# Patient Record
Sex: FEMALE | Race: WHITE | NOT HISPANIC OR LATINO | Employment: FULL TIME | URBAN - METROPOLITAN AREA
[De-identification: names, ages, dates, MRNs, and addresses within clinical notes are randomized per-mention and may not be internally consistent; named-entity substitution may affect disease eponyms.]

---

## 2019-05-10 ENCOUNTER — OFFICE VISIT (OUTPATIENT)
Dept: URGENT CARE | Age: 24
End: 2019-05-10
Payer: COMMERCIAL

## 2019-05-10 VITALS
RESPIRATION RATE: 18 BRPM | DIASTOLIC BLOOD PRESSURE: 59 MMHG | HEART RATE: 94 BPM | SYSTOLIC BLOOD PRESSURE: 130 MMHG | TEMPERATURE: 98.5 F | WEIGHT: 215 LBS | HEIGHT: 64 IN | OXYGEN SATURATION: 97 % | BODY MASS INDEX: 36.7 KG/M2

## 2019-05-10 DIAGNOSIS — J02.9 SORE THROAT: ICD-10-CM

## 2019-05-10 DIAGNOSIS — J03.90 ACUTE TONSILLITIS, UNSPECIFIED ETIOLOGY: Primary | ICD-10-CM

## 2019-05-10 LAB — S PYO AG THROAT QL: NEGATIVE

## 2019-05-10 PROCEDURE — 87430 STREP A AG IA: CPT | Performed by: FAMILY MEDICINE

## 2019-05-10 PROCEDURE — G0382 LEV 3 HOSP TYPE B ED VISIT: HCPCS | Performed by: FAMILY MEDICINE

## 2019-05-10 PROCEDURE — 87070 CULTURE OTHR SPECIMN AEROBIC: CPT | Performed by: FAMILY MEDICINE

## 2019-05-10 RX ORDER — AMOXICILLIN 500 MG/1
500 CAPSULE ORAL EVERY 8 HOURS SCHEDULED
Qty: 30 CAPSULE | Refills: 0 | Status: SHIPPED | OUTPATIENT
Start: 2019-05-10 | End: 2019-05-20

## 2019-05-12 LAB — BACTERIA THROAT CULT: NORMAL

## 2019-07-11 ENCOUNTER — OFFICE VISIT (OUTPATIENT)
Dept: OBGYN CLINIC | Facility: CLINIC | Age: 24
End: 2019-07-11
Payer: COMMERCIAL

## 2019-07-11 VITALS
HEIGHT: 65 IN | DIASTOLIC BLOOD PRESSURE: 74 MMHG | BODY MASS INDEX: 35.39 KG/M2 | WEIGHT: 212.4 LBS | HEART RATE: 74 BPM | OXYGEN SATURATION: 100 % | SYSTOLIC BLOOD PRESSURE: 114 MMHG

## 2019-07-11 DIAGNOSIS — Z11.3 SCREENING EXAMINATION FOR STD (SEXUALLY TRANSMITTED DISEASE): ICD-10-CM

## 2019-07-11 DIAGNOSIS — Z12.4 ENCOUNTER FOR PAPANICOLAOU SMEAR FOR CERVICAL CANCER SCREENING: ICD-10-CM

## 2019-07-11 DIAGNOSIS — Z30.016 ENCOUNTER FOR INITIAL PRESCRIPTION OF TRANSDERMAL PATCH HORMONAL CONTRACEPTIVE DEVICE: ICD-10-CM

## 2019-07-11 DIAGNOSIS — Z01.419 ENCOUNTER FOR GYNECOLOGICAL EXAMINATION WITHOUT ABNORMAL FINDING: Primary | ICD-10-CM

## 2019-07-11 DIAGNOSIS — N92.6 MISSED MENSES: ICD-10-CM

## 2019-07-11 LAB — SL AMB POCT URINE HCG: NEGATIVE

## 2019-07-11 PROCEDURE — 87660 TRICHOMONAS VAGIN DIR PROBE: CPT | Performed by: NURSE PRACTITIONER

## 2019-07-11 PROCEDURE — 81025 URINE PREGNANCY TEST: CPT | Performed by: NURSE PRACTITIONER

## 2019-07-11 PROCEDURE — 87591 N.GONORRHOEAE DNA AMP PROB: CPT | Performed by: NURSE PRACTITIONER

## 2019-07-11 PROCEDURE — 87480 CANDIDA DNA DIR PROBE: CPT | Performed by: NURSE PRACTITIONER

## 2019-07-11 PROCEDURE — 87510 GARDNER VAG DNA DIR PROBE: CPT | Performed by: NURSE PRACTITIONER

## 2019-07-11 PROCEDURE — 99395 PREV VISIT EST AGE 18-39: CPT | Performed by: NURSE PRACTITIONER

## 2019-07-11 PROCEDURE — 87491 CHLMYD TRACH DNA AMP PROBE: CPT | Performed by: NURSE PRACTITIONER

## 2019-07-11 PROCEDURE — G0145 SCR C/V CYTO,THINLAYER,RESCR: HCPCS | Performed by: NURSE PRACTITIONER

## 2019-07-11 NOTE — PROGRESS NOTES
Assessment / Plan    1  Encounter for gynecological examination without abnormal finding  Normal well woman exam  Pap smear obtained    2  Encounter for Papanicolaou smear for cervical cancer screening      3  Screening examination for STD (sexually transmitted disease)  Agrees to STI screening  Strongly counseled about risk for STDs and safe sex practices  4  Encounter for initial prescription of transdermal patch hormonal contraceptive device  Reviewed all birth control method options  Would like to try the patch to start  Reviewed method for starting and maintaining  Rx sent to her pharmacy  - norelgestromin-ethinyl estradiol (ORTHO EVRA) 150-35 MCG/24HR; Place 1 patch on the skin once a week  Dispense: 9 patch; Refill: 4    5  Missed menses  upt negative    - POCT urine HCG      Umair Still is a 26 y o  female who presents for her annual gynecologic exam   NEW PATIENT    25 yo g o presents for yearly and to initiate birth control  Interested in [place] IUD  Has taken OCP in the past   Did okay on it but didn't always remember to take it  [place] age 17; 11 lifetime partners; 3 partners in the last year  Not using condoms consistently  Periods are regular every 28-30 days  Current contraception: none  History of abnormal Pap smear: n/a  Family history of breast,uterine, ovarian or colon cancer: no    Menstrual History:  OB History        0    Para   0    Term   0       0    AB   0    Living   0       SAB   0    TAB   0    Ectopic   0    Multiple   0    Live Births   0                Menarche age: 17  Patient's last menstrual period was 06/10/2019    Period Cycle (Days): 28  Period Duration (Days): 3  Period Pattern: Regular  Menstrual Flow: Moderate  Dysmenorrhea: (!) Mild    The following portions of the patient's history were reviewed and updated as appropriate: allergies, current medications, past family history, past medical history, past social history, past surgical history and problem list     Review of Systems      Review of Systems   Constitutional: Negative for chills and fever  Gastrointestinal: Negative for abdominal distention, abdominal pain, blood in stool, constipation, diarrhea, nausea and vomiting  Genitourinary: Negative for difficulty urinating, dysuria, frequency, genital sores, hematuria, menstrual problem, pelvic pain, urgency, vaginal bleeding and vaginal discharge  Breasts:  Negative for skin changes, dimpling, asymmetry, nipple discharge, redness, tenderness or palpable masses    Objective      /74 (BP Location: Left arm, Patient Position: Sitting, Cuff Size: Adult)   Pulse 74   Ht 5' 5" (1 651 m)   Wt 96 3 kg (212 lb 6 4 oz)   LMP 06/10/2019   SpO2 100%   BMI 35 35 kg/m²      Physical Exam   Constitutional: She is oriented to person, place, and time  She appears well-developed and well-nourished  No distress  HENT:   Head: Normocephalic and atraumatic  Eyes: Pupils are equal, round, and reactive to light  Neck: Neck supple  No thyromegaly present  Pulmonary/Chest: Effort normal  Right breast exhibits no inverted nipple, no mass, no nipple discharge, no skin change and no tenderness  Left breast exhibits no inverted nipple, no mass, no nipple discharge, no skin change and no tenderness  Breasts are symmetrical    Abdominal: Soft  Normal appearance  She exhibits no mass  There is no tenderness  There is no CVA tenderness  Genitourinary: Pelvic exam was performed with patient supine  No labial fusion  There is no rash, tenderness, lesion or injury on the right labia  There is no rash, tenderness, lesion or injury on the left labia  Uterus is not enlarged and not tender  Cervix exhibits no motion tenderness, no discharge and no friability  Right adnexum displays no mass and no tenderness  Left adnexum displays no mass and no tenderness  No erythema, tenderness or bleeding in the vagina  No foreign body in the vagina  No signs of injury around the vagina  No vaginal discharge found  Lymphadenopathy:     She has no cervical adenopathy  She has no axillary adenopathy  Right: No inguinal and no supraclavicular adenopathy present  Left: No inguinal and no supraclavicular adenopathy present  Neurological: She is alert and oriented to person, place, and time  Skin: Skin is warm, dry and intact  Psychiatric: She has a normal mood and affect   Her behavior is normal  Thought content normal

## 2019-07-11 NOTE — PATIENT INSTRUCTIONS
Apply the first patch on the [facility] after your next menstrual period  Keep 1st patch on for one week  On the 2nd Sunday, remove the old patch and apply a new patch  On the 3rd Sunday, remove old patch and apply a new patch  On week 4 remove the patch and do not apply a new one  This is the week you will have menstrual bleeding  Repeat above on the following Sunday  Rotate patch sites to avoid irritation in the same spot

## 2019-07-12 LAB
C TRACH DNA SPEC QL NAA+PROBE: NEGATIVE
N GONORRHOEA DNA SPEC QL NAA+PROBE: NEGATIVE

## 2019-07-13 LAB
CANDIDA RRNA VAG QL PROBE: NEGATIVE
G VAGINALIS RRNA GENITAL QL PROBE: POSITIVE
T VAGINALIS RRNA GENITAL QL PROBE: NEGATIVE

## 2019-07-15 ENCOUNTER — TELEPHONE (OUTPATIENT)
Dept: OBGYN CLINIC | Facility: CLINIC | Age: 24
End: 2019-07-15

## 2019-07-15 DIAGNOSIS — N76.0 BACTERIAL VAGINOSIS: Primary | ICD-10-CM

## 2019-07-15 DIAGNOSIS — B96.89 BACTERIAL VAGINOSIS: Primary | ICD-10-CM

## 2019-07-15 RX ORDER — METRONIDAZOLE 500 MG/1
500 TABLET ORAL EVERY 12 HOURS SCHEDULED
Qty: 14 TABLET | Refills: 0 | Status: SHIPPED | OUTPATIENT
Start: 2019-07-15 | End: 2019-07-22

## 2019-07-15 NOTE — TELEPHONE ENCOUNTER
Left message for patient making her aware that prescription is at her pharmacy and all other cultures were negative

## 2019-07-15 NOTE — TELEPHONE ENCOUNTER
----- Message from Andi Duckworth 35 sent at 7/15/2019  8:24 AM EDT -----  Please inform patient that her vaginal culture showed overgrowth of bacteria  I will send a prescription to her pharmacy for oral metronidazole  Also inform her that her chlamydia and gonorrhea screening was negative  Thank you

## 2019-07-18 LAB
LAB AP GYN PRIMARY INTERPRETATION: NORMAL
Lab: NORMAL
PATH INTERP SPEC-IMP: NORMAL

## 2020-01-08 ENCOUNTER — APPOINTMENT (EMERGENCY)
Dept: RADIOLOGY | Facility: HOSPITAL | Age: 25
End: 2020-01-08
Payer: COMMERCIAL

## 2020-01-08 ENCOUNTER — HOSPITAL ENCOUNTER (EMERGENCY)
Facility: HOSPITAL | Age: 25
Discharge: HOME | End: 2020-01-08
Attending: EMERGENCY MEDICINE | Admitting: EMERGENCY MEDICINE
Payer: COMMERCIAL

## 2020-01-08 VITALS
BODY MASS INDEX: 36.43 KG/M2 | HEIGHT: 64 IN | TEMPERATURE: 98.9 F | DIASTOLIC BLOOD PRESSURE: 77 MMHG | RESPIRATION RATE: 14 BRPM | HEART RATE: 68 BPM | OXYGEN SATURATION: 99 % | WEIGHT: 213.41 LBS | SYSTOLIC BLOOD PRESSURE: 133 MMHG

## 2020-01-08 DIAGNOSIS — S93.401A RIGHT ANKLE SPRAIN: Primary | ICD-10-CM

## 2020-01-08 PROCEDURE — 73610 X-RAY EXAM OF ANKLE: CPT

## 2020-01-08 PROCEDURE — 99283 EMERGENCY DEPT VISIT LOW MDM: CPT

## 2020-01-08 PROCEDURE — 99284 EMERGENCY DEPT VISIT MOD MDM: CPT | Performed by: PHYSICIAN ASSISTANT

## 2020-01-08 PROCEDURE — 73630 X-RAY EXAM OF FOOT: CPT

## 2020-01-08 RX ORDER — IBUPROFEN 600 MG/1
600 TABLET ORAL EVERY 6 HOURS PRN
Qty: 20 TABLET | Refills: 0 | Status: SHIPPED | OUTPATIENT
Start: 2020-01-08 | End: 2020-01-08 | Stop reason: SDUPTHER

## 2020-01-08 RX ORDER — IBUPROFEN 600 MG/1
600 TABLET ORAL EVERY 6 HOURS PRN
Qty: 20 TABLET | Refills: 0 | Status: SHIPPED | OUTPATIENT
Start: 2020-01-08 | End: 2020-06-08

## 2020-01-08 NOTE — ED PROVIDER NOTES
History  Chief Complaint   Patient presents with    Foot Pain     right foot pain and swelling after slipping on ice today     27year old female presents today complaining of right foot and ankle pain after twisting her ankle  Worse with weight-bearing  Nothing taken for pain PTA  4/10 pain, doesn't radiate  Put ice on it prior to arrival  Denies numbness or tingling  Prior to Admission Medications   Prescriptions Last Dose Informant Patient Reported? Taking?   norelgestromin-ethinyl estradiol (ORTHO EVRA) 150-35 MCG/24HR Unknown at Unknown time  No No   Sig: Place 1 patch on the skin once a week      Facility-Administered Medications: None       Past Medical History:   Diagnosis Date    History of PCOS     getting periods regularly now    Obesity, Class II, BMI 35-39 9     Varicella vaccine        Past Surgical History:   Procedure Laterality Date    EAR TUBE REMOVAL      WISDOM TOOTH EXTRACTION         Family History   Problem Relation Age of Onset    Diabetes Paternal Grandmother     Breast cancer Neg Hx     Colon cancer Neg Hx     Ovarian cancer Neg Hx     Uterine cancer Neg Hx      I have reviewed and agree with the history as documented  Social History     Tobacco Use    Smoking status: Former Smoker     Last attempt to quit: 2019     Years since quittin 0    Smokeless tobacco: Current User   Substance Use Topics    Alcohol use: Yes     Comment: rare    Drug use: Never        Review of Systems   Musculoskeletal: Positive for arthralgias  All other systems reviewed and are negative  Physical Exam  Physical Exam   Constitutional: She appears well-developed and well-nourished  No distress  Cardiovascular: Normal rate  Pulmonary/Chest: No respiratory distress  Musculoskeletal:   Pain with dorsiflexion  No ecchymosis  DP pulse intact  Brisk cap refill  Sensation intact  Neurological: She is alert  Skin: Skin is warm and dry   Capillary refill takes less than 2 seconds  She is not diaphoretic  Psychiatric: She has a normal mood and affect  Vital Signs  ED Triage Vitals [01/08/20 1046]   Temperature Pulse Respirations Blood Pressure SpO2   98 9 °F (37 2 °C) 68 14 133/77 99 %      Temp Source Heart Rate Source Patient Position - Orthostatic VS BP Location FiO2 (%)   Temporal Monitor -- Right arm --      Pain Score       4           Vitals:    01/08/20 1046   BP: 133/77   Pulse: 68         Visual Acuity      ED Medications  Medications - No data to display    Diagnostic Studies  Results Reviewed     None                 XR ankle 3+ views RIGHT   Final Result by Hubert Cox DO (01/08 1400)      No acute osseous abnormality  Workstation performed: [id number]         XR foot 3+ views RIGHT   Final Result by Hubert Cox DO (01/08 1400)      No acute osseous abnormality  Workstation performed: [id number]                    Procedures  Procedures         ED Course                               MDM      Disposition  Final diagnoses:   Right ankle sprain     Time reflects when diagnosis was documented in both MDM as applicable and the Disposition within this note     Time User Action Codes Description Comment    1/8/2020 12:18 PM Terence Husbands Add [R71 [id number]] Right ankle sprain       ED Disposition     ED Disposition Condition Date/Time Comment    Discharge Stable Wed Jan 8, 2020 12:18 PM Chacha Pennington discharge to home/self care  Follow-up Information    None         Discharge Medication List as of 1/8/2020 12:19 PM      CONTINUE these medications which have NOT CHANGED    Details   norelgestromin-ethinyl estradiol (ORTHO EVRA) 150-35 MCG/24HR Place 1 patch on the skin once a week, Starting Thu 7/11/2019, Normal           No discharge procedures on file      ED Provider  Electronically Signed by           Susana Boas, PA-C  01/12/20 9948

## 2020-03-02 ENCOUNTER — OFFICE VISIT (OUTPATIENT)
Dept: OBGYN CLINIC | Facility: CLINIC | Age: 25
End: 2020-03-02
Payer: COMMERCIAL

## 2020-03-02 VITALS
SYSTOLIC BLOOD PRESSURE: 116 MMHG | DIASTOLIC BLOOD PRESSURE: 72 MMHG | HEIGHT: 65 IN | BODY MASS INDEX: 35.32 KG/M2 | WEIGHT: 212 LBS

## 2020-03-02 DIAGNOSIS — Z33.1 INCIDENTAL PREGNANCY CONFIRMED: Primary | ICD-10-CM

## 2020-03-02 DIAGNOSIS — N91.2 AMENORRHEA: ICD-10-CM

## 2020-03-02 DIAGNOSIS — O21.9 NAUSEA AND VOMITING IN PREGNANCY: ICD-10-CM

## 2020-03-02 DIAGNOSIS — O99.211 OBESITY AFFECTING PREGNANCY IN FIRST TRIMESTER: ICD-10-CM

## 2020-03-02 LAB — SL AMB POCT URINE HCG: POSITIVE

## 2020-03-02 PROCEDURE — 81025 URINE PREGNANCY TEST: CPT | Performed by: OBSTETRICS & GYNECOLOGY

## 2020-03-02 PROCEDURE — 99203 OFFICE O/P NEW LOW 30 MIN: CPT | Performed by: OBSTETRICS & GYNECOLOGY

## 2020-03-02 NOTE — PROGRESS NOTES
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  Exercises weekly  Does not smoke cigarettes, drink alcohol, or do illicit drugs  Past medical history  No medical illnesses no prior pregnancies  Subjective:  Here pregnancy confirmation     Patient ID: Stephanie Reyes is a 27 y o  female  HPI   82-year-old female  1 para 0 FD LMP was 2020  Estimated gestational age 11 weeks and 0 days, [facility] 10/12/2020  Unplanned on prevented pregnancy  Patient was formally on birth control stopped several months ago  She does have some minor nausea symptoms feels like she is getting somewhat  Patient oriented to office practice and procedures and palsies  Recommend screening laboratory studies as well as a dating pelvic ultrasound  All questions answered follow-up 2 weeks for OB physical exam       Review of Systems   Respiratory: Negative  Cardiovascular: Negative  Gastrointestinal: Positive for nausea and vomiting  Genitourinary: Negative  Psychiatric/Behavioral: Negative  All other systems reviewed and are negative  Objective:  No acute distress  /72 (BP Location: Right arm, Patient Position: Sitting, Cuff Size: Standard)   Ht 5' 5" (1 651 m)   Wt 96 2 kg (212 lb)   LMP 2020 (Exact Date)   BMI 35 28 kg/m²      Physical Exam   Constitutional: She is oriented to person, place, and time   She appears well-developed  HENT:   Head: Normocephalic  Eyes: Pupils are equal, round, and reactive to light  Neck: Normal range of motion  Pulmonary/Chest: Effort normal    Genitourinary:   Genitourinary Comments: Pelvic exam deferred   Musculoskeletal: Normal range of motion  Neurological: She is alert and oriented to person, place, and time  Skin: Skin is warm and dry  Psychiatric: She has a normal mood and affect  Her behavior is normal    Vitals reviewed

## 2020-03-04 ENCOUNTER — INITIAL PRENATAL (OUTPATIENT)
Dept: OBGYN CLINIC | Facility: CLINIC | Age: 25
End: 2020-03-04
Payer: COMMERCIAL

## 2020-03-04 ENCOUNTER — ULTRASOUND (OUTPATIENT)
Dept: OBGYN CLINIC | Facility: CLINIC | Age: 25
End: 2020-03-04
Payer: COMMERCIAL

## 2020-03-04 DIAGNOSIS — Z36.9 ANTENATAL SCREENING ENCOUNTER: Primary | ICD-10-CM

## 2020-03-04 DIAGNOSIS — Z34.01 ENCOUNTER FOR SUPERVISION OF NORMAL FIRST PREGNANCY IN FIRST TRIMESTER: Primary | ICD-10-CM

## 2020-03-04 PROCEDURE — 76817 TRANSVAGINAL US OBSTETRIC: CPT | Performed by: OBSTETRICS & GYNECOLOGY

## 2020-03-04 PROCEDURE — OBC

## 2020-03-04 NOTE — PROGRESS NOTES
Ultrasound performed for dates and viability  MFM packet given  Patient is currently declining 1st trimester screening  She will schedule Level II ultrasound

## 2020-03-04 NOTE — PROGRESS NOTES
OB INTAKE INTERVIEW  * Pt presents for OB intake  * Accompanied by: FOB  *  *Hx of  delivery prior to 36 weeks 6 days NO  *Last Menstrual Period: Pt's LMP was Patient's last menstrual period was 2020  *Ultrasound date:3/4/20   8weeks 0days  *Estimated date of delivery: 10 14 20   * confirmed by US    *Signs/Symptoms of Pregnancy   *NONE   *constipation NO   *headaches NO   *cramping/spotting NO   *PICA cravings NOP  *Diabetes- if you answer yes, please order 1 hour GTT, 50g   *hx of GDM NO   *BMI >35 NO   *first degree relative with type 2 diabetes NO   *hx of PCOS NO   *current metformin use NO   *prior hx of LGA/macrosomia NO   *AMA with other risk factors NO  *Hypertension- if you answer yes, please order preeclampsia labs including 24 hour urine protein   *Hx of chronic HTN NO   *hx of gestational HTN NO   *hx of preeclampsia, eclampsia, or HELLP syndrome NO  *Infection Screening-    *does the pt have a hx of MRSA? NO   *if yes- please follow MRSA protocol and obtain a nasal swab for MRSA culture   *history of herpes? NO  *Immunizations:   *influenza vaccine given today NO   *discussed Tdap vaccine    *Interview education   *Handouts given:    *Baby and Me phone sarah guide    *Baby and Me support center    *[facility] sign up instructions    *Lab Locations    *[facility]    *Tour of [address] discussed    *Pre-Birth Registration encouraged    *Pregnancy Warning Signs reviewed    *Safe Medications During Pregnancy    *[facility]     *discussed genetic testing- pt interested NO     *appointment at [facility] made NO, PT WILL CALL FOR 20 WEEK SCAN      *I have these concerns about this prenatal patient: NONE  *Details that I feel the provider should be aware of: NONE    PN1 visit scheduled  The patient was oriented to our practice and all questions were answered      Interviewed by:

## 2020-03-06 ENCOUNTER — APPOINTMENT (OUTPATIENT)
Dept: LAB | Facility: IMAGING CENTER | Age: 25
End: 2020-03-06
Payer: COMMERCIAL

## 2020-03-06 DIAGNOSIS — Z33.1 INCIDENTAL PREGNANCY CONFIRMED: ICD-10-CM

## 2020-03-06 DIAGNOSIS — N91.2 AMENORRHEA: ICD-10-CM

## 2020-03-06 LAB
ABO GROUP BLD: NORMAL
B-HCG SERPL-ACNC: ABNORMAL MIU/ML
BACTERIA UR QL AUTO: ABNORMAL /HPF
BASOPHILS # BLD AUTO: 0.02 THOUSANDS/ΜL (ref 0–0.1)
BASOPHILS NFR BLD AUTO: 0 % (ref 0–1)
BILIRUB UR QL STRIP: NEGATIVE
BLD GP AB SCN SERPL QL: NEGATIVE
CLARITY UR: ABNORMAL
COLOR UR: ABNORMAL
EOSINOPHIL # BLD AUTO: 0.04 THOUSAND/ΜL (ref 0–0.61)
EOSINOPHIL NFR BLD AUTO: 0 % (ref 0–6)
ERYTHROCYTE [DISTWIDTH] IN BLOOD BY AUTOMATED COUNT: 13.2 % (ref 11.6–15.1)
GLUCOSE UR STRIP-MCNC: NEGATIVE MG/DL
HBV SURFACE AG SER QL: NORMAL
HCT VFR BLD AUTO: 41.1 % (ref 34.8–46.1)
HGB BLD-MCNC: 13.1 G/DL (ref 11.5–15.4)
HGB UR QL STRIP.AUTO: NEGATIVE
IMM GRANULOCYTES # BLD AUTO: 0.04 THOUSAND/UL (ref 0–0.2)
IMM GRANULOCYTES NFR BLD AUTO: 0 % (ref 0–2)
KETONES UR STRIP-MCNC: NEGATIVE MG/DL
LEUKOCYTE ESTERASE UR QL STRIP: ABNORMAL
LYMPHOCYTES # BLD AUTO: 1.61 THOUSANDS/ΜL (ref 0.6–4.47)
LYMPHOCYTES NFR BLD AUTO: 17 % (ref 14–44)
MCH RBC QN AUTO: 29.8 PG (ref 26.8–34.3)
MCHC RBC AUTO-ENTMCNC: 31.9 G/DL (ref 31.4–37.4)
MCV RBC AUTO: 93 FL (ref 82–98)
MONOCYTES # BLD AUTO: 0.73 THOUSAND/ΜL (ref 0.17–1.22)
MONOCYTES NFR BLD AUTO: 8 % (ref 4–12)
MUCOUS THREADS UR QL AUTO: ABNORMAL
NEUTROPHILS # BLD AUTO: 7.27 THOUSANDS/ΜL (ref 1.85–7.62)
NEUTS SEG NFR BLD AUTO: 75 % (ref 43–75)
NITRITE UR QL STRIP: NEGATIVE
NON-SQ EPI CELLS URNS QL MICRO: ABNORMAL /HPF
NRBC BLD AUTO-RTO: 0 /100 WBCS
PH UR STRIP.AUTO: 6.5 [PH]
PLATELET # BLD AUTO: 216 THOUSANDS/UL (ref 149–390)
PMV BLD AUTO: 11.1 FL (ref 8.9–12.7)
PROGEST SERPL-MCNC: 11.5 NG/ML
PROT UR STRIP-MCNC: NEGATIVE MG/DL
RBC # BLD AUTO: 4.4 MILLION/UL (ref 3.81–5.12)
RBC #/AREA URNS AUTO: ABNORMAL /HPF
RH BLD: POSITIVE
SP GR UR STRIP.AUTO: 1.03 (ref 1–1.03)
SPECIMEN EXPIRATION DATE: NORMAL
T4 FREE SERPL-MCNC: 1.11 NG/DL (ref 0.76–1.46)
TSH SERPL DL<=0.05 MIU/L-ACNC: 0.15 UIU/ML (ref 0.36–3.74)
UROBILINOGEN UR QL STRIP.AUTO: 1 E.U./DL
WBC # BLD AUTO: 9.71 THOUSAND/UL (ref 4.31–10.16)
WBC #/AREA URNS AUTO: ABNORMAL /HPF

## 2020-03-06 PROCEDURE — 80081 OBSTETRIC PANEL INC HIV TSTG: CPT

## 2020-03-06 PROCEDURE — 87086 URINE CULTURE/COLONY COUNT: CPT

## 2020-03-06 PROCEDURE — 84144 ASSAY OF PROGESTERONE: CPT

## 2020-03-06 PROCEDURE — 36415 COLL VENOUS BLD VENIPUNCTURE: CPT

## 2020-03-06 PROCEDURE — 84443 ASSAY THYROID STIM HORMONE: CPT

## 2020-03-06 PROCEDURE — 84702 CHORIONIC GONADOTROPIN TEST: CPT

## 2020-03-06 PROCEDURE — 84439 ASSAY OF FREE THYROXINE: CPT

## 2020-03-06 PROCEDURE — 81001 URINALYSIS AUTO W/SCOPE: CPT

## 2020-03-07 LAB — RUBV IGG SERPL IA-ACNC: 65.7 IU/ML

## 2020-03-08 LAB
BACTERIA UR CULT: NORMAL
HIV 1+2 AB+HIV1 P24 AG SERPL QL IA: NORMAL

## 2020-03-09 LAB — RPR SER QL: NORMAL

## 2020-03-18 ENCOUNTER — ROUTINE PRENATAL (OUTPATIENT)
Dept: OBGYN CLINIC | Facility: CLINIC | Age: 25
End: 2020-03-18
Payer: COMMERCIAL

## 2020-03-18 VITALS — WEIGHT: 210 LBS | DIASTOLIC BLOOD PRESSURE: 68 MMHG | SYSTOLIC BLOOD PRESSURE: 116 MMHG | BODY MASS INDEX: 34.95 KG/M2

## 2020-03-18 DIAGNOSIS — Z3A.10 10 WEEKS GESTATION OF PREGNANCY: Primary | ICD-10-CM

## 2020-03-18 LAB
SL AMB  POCT GLUCOSE, UA: NORMAL
SL AMB POCT URINE PROTEIN: NORMAL

## 2020-03-18 PROCEDURE — 87591 N.GONORRHOEAE DNA AMP PROB: CPT | Performed by: OBSTETRICS & GYNECOLOGY

## 2020-03-18 PROCEDURE — PNV: Performed by: OBSTETRICS & GYNECOLOGY

## 2020-03-18 PROCEDURE — G0145 SCR C/V CYTO,THINLAYER,RESCR: HCPCS | Performed by: OBSTETRICS & GYNECOLOGY

## 2020-03-18 PROCEDURE — 81002 URINALYSIS NONAUTO W/O SCOPE: CPT | Performed by: OBSTETRICS & GYNECOLOGY

## 2020-03-18 PROCEDURE — 87491 CHLMYD TRACH DNA AMP PROBE: CPT | Performed by: OBSTETRICS & GYNECOLOGY

## 2020-03-18 NOTE — PROGRESS NOTES
IUP at 10 weeks and 0 days  Patient here for OB intake physical exam   Patient having some nausea and vomiting symptoms  Will try Reglan  She was taking gender with little relief in her symptoms  Recommend sequential screen testing which will be done within the next 2 weeks followed by 20 week ultrasound  All questions answered follow-up in 4 weeks and p r n

## 2020-03-19 LAB
C TRACH DNA SPEC QL NAA+PROBE: NEGATIVE
N GONORRHOEA DNA SPEC QL NAA+PROBE: NEGATIVE

## 2020-03-24 LAB
LAB AP GYN PRIMARY INTERPRETATION: NORMAL
LAB AP LMP: NORMAL
Lab: NORMAL

## 2020-04-16 ENCOUNTER — TELEMEDICINE (OUTPATIENT)
Dept: OBGYN CLINIC | Facility: CLINIC | Age: 25
End: 2020-04-16
Payer: COMMERCIAL

## 2020-04-16 DIAGNOSIS — J30.2 SEASONAL ALLERGIES: ICD-10-CM

## 2020-04-16 DIAGNOSIS — Z3A.14 14 WEEKS GESTATION OF PREGNANCY: Primary | ICD-10-CM

## 2020-04-16 DIAGNOSIS — R11.0 NAUSEA: ICD-10-CM

## 2020-04-16 DIAGNOSIS — Z36.9 ANTENATAL SCREENING ENCOUNTER: Primary | ICD-10-CM

## 2020-04-16 PROCEDURE — 99212 OFFICE O/P EST SF 10 MIN: CPT | Performed by: OBSTETRICS & GYNECOLOGY

## 2020-04-20 ENCOUNTER — NURSE TRIAGE (OUTPATIENT)
Dept: OTHER | Facility: OTHER | Age: 25
End: 2020-04-20

## 2020-04-20 DIAGNOSIS — Z20.828 SARS-ASSOCIATED CORONAVIRUS EXPOSURE: Primary | ICD-10-CM

## 2020-04-20 DIAGNOSIS — Z20.828 SARS-ASSOCIATED CORONAVIRUS EXPOSURE: ICD-10-CM

## 2020-04-20 PROCEDURE — 87635 SARS-COV-2 COVID-19 AMP PRB: CPT

## 2020-04-21 ENCOUNTER — TELEPHONE (OUTPATIENT)
Dept: OBGYN CLINIC | Facility: CLINIC | Age: 25
End: 2020-04-21

## 2020-04-21 ENCOUNTER — TELEPHONE (OUTPATIENT)
Dept: FAMILY MEDICINE CLINIC | Facility: CLINIC | Age: 25
End: 2020-04-21

## 2020-04-21 ENCOUNTER — TELEMEDICINE (OUTPATIENT)
Dept: FAMILY MEDICINE CLINIC | Facility: CLINIC | Age: 25
End: 2020-04-21
Payer: COMMERCIAL

## 2020-04-21 DIAGNOSIS — U07.1 COVID-19 VIRUS INFECTION: Primary | ICD-10-CM

## 2020-04-21 LAB — SARS-COV-2 RNA SPEC QL NAA+PROBE: DETECTED

## 2020-04-21 PROCEDURE — 99442 PR PHYS/QHP TELEPHONE EVALUATION 11-20 MIN: CPT | Performed by: FAMILY MEDICINE

## 2020-04-22 ENCOUNTER — TELEMEDICINE (OUTPATIENT)
Dept: FAMILY MEDICINE CLINIC | Facility: CLINIC | Age: 25
End: 2020-04-22
Payer: COMMERCIAL

## 2020-04-22 DIAGNOSIS — U07.1 COVID-19 VIRUS INFECTION: Primary | ICD-10-CM

## 2020-04-22 PROCEDURE — 99213 OFFICE O/P EST LOW 20 MIN: CPT | Performed by: FAMILY MEDICINE

## 2020-06-05 ENCOUNTER — TELEPHONE (OUTPATIENT)
Dept: PERINATAL CARE | Facility: CLINIC | Age: 25
End: 2020-06-05

## 2020-06-08 ENCOUNTER — ROUTINE PRENATAL (OUTPATIENT)
Dept: PERINATAL CARE | Facility: CLINIC | Age: 25
End: 2020-06-08
Payer: COMMERCIAL

## 2020-06-08 VITALS
HEIGHT: 65 IN | TEMPERATURE: 98.1 F | WEIGHT: 223.2 LBS | HEART RATE: 89 BPM | DIASTOLIC BLOOD PRESSURE: 81 MMHG | SYSTOLIC BLOOD PRESSURE: 132 MMHG | BODY MASS INDEX: 37.19 KG/M2

## 2020-06-08 DIAGNOSIS — Z36.9 ANTENATAL SCREENING ENCOUNTER: ICD-10-CM

## 2020-06-08 DIAGNOSIS — O99.210 OBESITY AFFECTING PREGNANCY, ANTEPARTUM: Primary | ICD-10-CM

## 2020-06-08 DIAGNOSIS — Z3A.20 20 WEEKS GESTATION OF PREGNANCY: ICD-10-CM

## 2020-06-08 DIAGNOSIS — Z36.3 ENCOUNTER FOR ANTENATAL SCREENING FOR MALFORMATIONS: ICD-10-CM

## 2020-06-08 DIAGNOSIS — U07.1 COVID-19 VIRUS INFECTION: ICD-10-CM

## 2020-06-08 DIAGNOSIS — Z36.86 ENCOUNTER FOR ANTENATAL SCREENING FOR CERVICAL LENGTH: ICD-10-CM

## 2020-06-08 PROBLEM — Z30.016 ENCOUNTER FOR INITIAL PRESCRIPTION OF TRANSDERMAL PATCH HORMONAL CONTRACEPTIVE DEVICE: Status: RESOLVED | Noted: 2019-07-11 | Resolved: 2020-06-08

## 2020-06-08 PROCEDURE — 76817 TRANSVAGINAL US OBSTETRIC: CPT | Performed by: OBSTETRICS & GYNECOLOGY

## 2020-06-08 PROCEDURE — 76811 OB US DETAILED SNGL FETUS: CPT | Performed by: OBSTETRICS & GYNECOLOGY

## 2020-06-08 PROCEDURE — 99243 OFF/OP CNSLTJ NEW/EST LOW 30: CPT | Performed by: OBSTETRICS & GYNECOLOGY

## 2020-06-09 ENCOUNTER — TELEPHONE (OUTPATIENT)
Dept: OBGYN CLINIC | Facility: CLINIC | Age: 25
End: 2020-06-09

## 2020-06-09 PROBLEM — Z36.86 ENCOUNTER FOR ANTENATAL SCREENING FOR CERVICAL LENGTH: Status: ACTIVE | Noted: 2020-06-09

## 2020-06-09 PROBLEM — Z36.3 ENCOUNTER FOR ANTENATAL SCREENING FOR MALFORMATIONS: Status: ACTIVE | Noted: 2020-06-09

## 2020-06-15 ENCOUNTER — ROUTINE PRENATAL (OUTPATIENT)
Dept: OBGYN CLINIC | Facility: CLINIC | Age: 25
End: 2020-06-15
Payer: COMMERCIAL

## 2020-06-15 VITALS — SYSTOLIC BLOOD PRESSURE: 114 MMHG | DIASTOLIC BLOOD PRESSURE: 62 MMHG | WEIGHT: 221 LBS | BODY MASS INDEX: 36.78 KG/M2

## 2020-06-15 DIAGNOSIS — O99.210 OBESITY AFFECTING PREGNANCY, ANTEPARTUM: ICD-10-CM

## 2020-06-15 DIAGNOSIS — Z3A.22 22 WEEKS GESTATION OF PREGNANCY: Primary | ICD-10-CM

## 2020-06-15 LAB
SL AMB  POCT GLUCOSE, UA: NORMAL
SL AMB POCT URINE PROTEIN: NORMAL

## 2020-06-15 PROCEDURE — PNV: Performed by: OBSTETRICS & GYNECOLOGY

## 2020-06-15 PROCEDURE — 81002 URINALYSIS NONAUTO W/O SCOPE: CPT | Performed by: OBSTETRICS & GYNECOLOGY

## 2020-07-16 ENCOUNTER — ROUTINE PRENATAL (OUTPATIENT)
Dept: OBGYN CLINIC | Facility: CLINIC | Age: 25
End: 2020-07-16
Payer: COMMERCIAL

## 2020-07-16 VITALS
HEIGHT: 65 IN | SYSTOLIC BLOOD PRESSURE: 124 MMHG | WEIGHT: 230.4 LBS | DIASTOLIC BLOOD PRESSURE: 76 MMHG | BODY MASS INDEX: 38.39 KG/M2 | TEMPERATURE: 98.4 F

## 2020-07-16 DIAGNOSIS — Z3A.27 27 WEEKS GESTATION OF PREGNANCY: Primary | ICD-10-CM

## 2020-07-16 DIAGNOSIS — B37.3 VAGINAL CANDIDIASIS: ICD-10-CM

## 2020-07-16 LAB
BV WHIFF TEST VAG QL: NEGATIVE
CLUE CELLS SPEC QL WET PREP: NEGATIVE
ERYTHROCYTE [DISTWIDTH] IN BLOOD BY AUTOMATED COUNT: 13.7 % (ref 11.6–15.1)
GLUCOSE 1H P 50 G GLC PO SERPL-MCNC: 97 MG/DL
HCT VFR BLD AUTO: 38.3 % (ref 34.8–46.1)
HGB BLD-MCNC: 12.2 G/DL (ref 11.5–15.4)
MCH RBC QN AUTO: 30.3 PG (ref 26.8–34.3)
MCHC RBC AUTO-ENTMCNC: 31.9 G/DL (ref 31.4–37.4)
MCV RBC AUTO: 95 FL (ref 82–98)
PH SMN: 4.5 [PH]
PLATELET # BLD AUTO: 211 THOUSANDS/UL (ref 149–390)
PMV BLD AUTO: 11.7 FL (ref 8.9–12.7)
RBC # BLD AUTO: 4.02 MILLION/UL (ref 3.81–5.12)
SL AMB  POCT GLUCOSE, UA: NEGATIVE
SL AMB POCT URINE PROTEIN: NEGATIVE
SL AMB POCT WET MOUNT: YES
T VAGINALIS VAG QL WET PREP: NEGATIVE
WBC # BLD AUTO: 11.37 THOUSAND/UL (ref 4.31–10.16)
YEAST VAG QL WET PREP: ABNORMAL

## 2020-07-16 PROCEDURE — 82950 GLUCOSE TEST: CPT | Performed by: OBSTETRICS & GYNECOLOGY

## 2020-07-16 PROCEDURE — 86592 SYPHILIS TEST NON-TREP QUAL: CPT | Performed by: OBSTETRICS & GYNECOLOGY

## 2020-07-16 PROCEDURE — PNV: Performed by: OBSTETRICS & GYNECOLOGY

## 2020-07-16 PROCEDURE — 85027 COMPLETE CBC AUTOMATED: CPT | Performed by: OBSTETRICS & GYNECOLOGY

## 2020-07-16 PROCEDURE — 36415 COLL VENOUS BLD VENIPUNCTURE: CPT | Performed by: OBSTETRICS & GYNECOLOGY

## 2020-07-16 PROCEDURE — 81002 URINALYSIS NONAUTO W/O SCOPE: CPT | Performed by: OBSTETRICS & GYNECOLOGY

## 2020-07-16 PROCEDURE — 87210 SMEAR WET MOUNT SALINE/INK: CPT | Performed by: OBSTETRICS & GYNECOLOGY

## 2020-07-16 NOTE — PATIENT INSTRUCTIONS
Pregnancy at 32 to 30 [address]:   What changes are happening in my body? You may notice new symptoms such as shortness of breath, heartburn, or swelling of your ankles and feet  You may also have trouble sleeping or contractions  How do I care for myself at this stage of my pregnancy? · Eat a variety of healthy foods  Healthy foods include fruits, vegetables, whole-grain breads, low-fat dairy foods, beans, lean meats, and fish  Drink liquids as directed  Ask how much liquid to drink each day and which liquids are best for you  Limit caffeine to less than 200 milligrams each day  Limit your intake of fish to 2 servings each week  Choose fish low in mercury such as canned light tuna, shrimp, salmon, cod, or tilapia  Do not  eat fish high in mercury such as swordfish, tilefish, buddy mackerel, and shark  · Manage heartburn  by eating 4 or 5 small meals each day instead of large meals  Avoid spicy food  · Manage swelling  by lying down and putting your feet up  · Take prenatal vitamins as directed  Your need for certain vitamins and minerals, such as folic acid, increases during pregnancy  Prenatal vitamins provide some of the extra vitamins and minerals you need  Prenatal vitamins may also help to decrease the risk of certain birth defects  · Talk to your healthcare provider about exercise  Moderate exercise can help you stay fit  Your healthcare provider will help you plan an exercise program that is safe for you during pregnancy  · Do not smoke  If you smoke, it is never too late to quit  Smoking increases your risk of a miscarriage and other health problems during your pregnancy  Smoking can cause your baby to be born too early or weigh less at birth  Ask your healthcare provider for information if you need help quitting  · Do not drink alcohol  Alcohol passes from your body to your baby through the placenta   It can affect your baby's brain development and cause fetal alcohol syndrome (FAS)  FAS is a group of conditions that causes mental, behavior, and growth problems  · Talk to your healthcare provider before you take any medicines  Many medicines may harm your baby if you take them when you are pregnant  Do not take any medicines, vitamins, herbs, or supplements without first talking to your healthcare provider  Never use illegal or street drugs (such as marijuana or cocaine) while you are pregnant  What are some safety tips during pregnancy? · Avoid hot tubs and saunas  Do not use a hot tub or sauna while you are pregnant, especially during your first trimester  Hot tubs and saunas may raise your baby's temperature and increase the risk of birth defects  · Avoid toxoplasmosis  This is an infection caused by eating raw meat or being around infected cat feces  It can cause birth defects, miscarriages, and other problems  Wash your hands after you touch raw meat  Make sure any meat is well-cooked before you eat it  Avoid raw eggs and unpasteurized milk  Use gloves or ask someone else to clean your cat's litter box while you are pregnant  What changes are happening with my baby? By 30 weeks, your baby may weigh more than 3 pounds  Your baby may be about 11 inches long from the top of the head to the rump (baby's bottom)  Your baby's eyes open and close now  Your baby's kicks and movements are more forceful at this time  What do I need to know about prenatal care? Your healthcare provider will check your blood pressure and weight  You may also need the following:  · Blood tests  may be done to check for anemia or blood type  · A urine test  may also be done to check for sugar and protein  These can be signs of gestational diabetes or infection  Protein in your urine may also be a sign of preeclampsia  Preeclampsia is a condition that can develop during week 20 or later of your pregnancy   It causes high blood pressure, and it can cause problems with your kidneys and other organs  · A Tdap vaccine and flu vaccine  may be recommended by your healthcare provider  · A gestational diabetes screen  will be done using an oral glucose tolerance test (OGTT)  An OGTT starts with a blood sugar level check after you have not eaten for 8 hours  You are then given a glucose drink  Your blood sugar level is checked after 1 hour, 2 hours, and sometimes 3 hours  Healthcare providers look at how much your blood sugar level increases from the first check  · Fundal height  is a measurement of your uterus to check your baby's growth  This number is usually the same as the number of weeks that you have been pregnant  Your healthcare provider may also check your baby's position  · Your baby's heart rate  will be checked  When should I seek immediate care? · You develop a severe headache that does not go away  · You have new or increased vision changes, such as blurred or spotted vision  · You have new or increased swelling in your face or hands  · You have vaginal spotting or bleeding  · Your water broke or you feel warm water gushing or trickling from your vagina  When should I contact my healthcare provider? · You have more than 5 contractions in 1 hour  · You notice any changes in your baby's movements  · You have abdominal cramps, pressure, or tightening  · You have a change in vaginal discharge  · You have chills or a fever  · You have vaginal itching, burning, or pain  · You have yellow, green, white, or foul-smelling vaginal discharge  · You have pain or burning when you urinate, less urine than usual, or pink or bloody urine  · You have questions or concerns about your condition or care  CARE AGREEMENT:   You have the right to help plan your care  Learn about your health condition and how it may be treated  Discuss treatment options with your caregivers to decide what care you want to receive   You always have the right to refuse treatment  The above information is an  only  It is not intended as medical advice for individual conditions or treatments  Talk to your doctor, nurse or pharmacist before following any medical regimen to see if it is safe and effective for you  © 2017 [employer] Information is for End User's use only and may not be sold, redistributed or otherwise used for commercial purposes  All illustrations and images included in CareNotes® are the copyrighted property of [employer]  or [employer]

## 2020-07-16 NOTE — PROGRESS NOTES
Patient was seen today for prenatal visit  1  27 1 weeks-doing well  Good fetal movement noted  Fetal movement and  labor precautions were reviewed  Glucola and labs were done today  Patient to follow-up in 3-4 weeks time for prenatal visit, Tdap, and ultrasound for growth  2  Yeast infection-noted on exam today  Patient was counseled about treatment options  Miconazole 7 or miconazole 3 recommended, patient will obtain OTC  3  Elevated BMI  4  Previous COVID infection-no current issues  5  History of nausea/seasonal allergies-no current concerns

## 2020-07-17 LAB — RPR SER QL: NORMAL

## 2020-08-13 ENCOUNTER — ULTRASOUND (OUTPATIENT)
Dept: OBGYN CLINIC | Facility: CLINIC | Age: 25
End: 2020-08-13
Payer: COMMERCIAL

## 2020-08-13 ENCOUNTER — ROUTINE PRENATAL (OUTPATIENT)
Dept: OBGYN CLINIC | Facility: CLINIC | Age: 25
End: 2020-08-13
Payer: COMMERCIAL

## 2020-08-13 VITALS
BODY MASS INDEX: 39.05 KG/M2 | SYSTOLIC BLOOD PRESSURE: 130 MMHG | WEIGHT: 234.4 LBS | TEMPERATURE: 98.2 F | DIASTOLIC BLOOD PRESSURE: 76 MMHG | HEIGHT: 65 IN

## 2020-08-13 VITALS — TEMPERATURE: 98 F

## 2020-08-13 DIAGNOSIS — Z3A.31 31 WEEKS GESTATION OF PREGNANCY: Primary | ICD-10-CM

## 2020-08-13 DIAGNOSIS — Z36.89 ENCOUNTER FOR ULTRASOUND TO ASSESS FETAL GROWTH: Primary | ICD-10-CM

## 2020-08-13 LAB
SL AMB  POCT GLUCOSE, UA: NORMAL
SL AMB POCT URINE PROTEIN: NORMAL

## 2020-08-13 PROCEDURE — 81002 URINALYSIS NONAUTO W/O SCOPE: CPT | Performed by: OBSTETRICS & GYNECOLOGY

## 2020-08-13 PROCEDURE — 3008F BODY MASS INDEX DOCD: CPT | Performed by: OBSTETRICS & GYNECOLOGY

## 2020-08-13 PROCEDURE — 90471 IMMUNIZATION ADMIN: CPT | Performed by: OBSTETRICS & GYNECOLOGY

## 2020-08-13 PROCEDURE — PNV: Performed by: OBSTETRICS & GYNECOLOGY

## 2020-08-13 PROCEDURE — 76816 OB US FOLLOW-UP PER FETUS: CPT | Performed by: OBSTETRICS & GYNECOLOGY

## 2020-08-13 PROCEDURE — 90715 TDAP VACCINE 7 YRS/> IM: CPT | Performed by: OBSTETRICS & GYNECOLOGY

## 2020-08-13 NOTE — PROGRESS NOTES
Patient was seen today for prenatal visit  1  31 1 weeks-good fetal movement noted  Fetal movement and  labor precautions were reviewed  Tdap was given today  Follow-up 2 weeks prenatal visit  2   Elevated BMI  3  Previous yeast/nausea/seasonal allergies-no current concerns  4  Previous COVID-noted mid 2020  She has no residual symptoms

## 2020-08-13 NOTE — PATIENT INSTRUCTIONS
Pregnancy at [address] to [address]:   What changes are happening in my body? You may continue to have symptoms such as shortness of breath, heartburn, contractions, or swelling of your ankles and feet  You may be gaining about 1 pound a week now  How do I care for myself at this stage of my pregnancy? · Eat a variety of healthy foods  Healthy foods include fruits, vegetables, whole-grain breads, low-fat dairy foods, beans, lean meats, and fish  Drink liquids as directed  Ask how much liquid to drink each day and which liquids are best for you  Limit caffeine to less than 200 milligrams each day  Limit your intake of fish to 2 servings each week  Choose fish low in mercury such as canned light tuna, shrimp, salmon, cod, or tilapia  Do not  eat fish high in mercury such as swordfish, tilefish, buddy mackerel, and shark  · Manage heartburn  by eating 4 or 5 small meals each day instead of large meals  Avoid spicy food  · Manage swelling  by lying down and putting your feet up  · Take prenatal vitamins as directed  Your need for certain vitamins and minerals, such as folic acid, increases during pregnancy  Prenatal vitamins provide some of the extra vitamins and minerals you need  Prenatal vitamins may also help to decrease the risk of certain birth defects  · Talk to your healthcare provider about exercise  Moderate exercise can help you stay fit  Your healthcare provider will help you plan an exercise program that is safe for you during pregnancy  · Do not smoke  If you smoke, it is never too late to quit  Smoking increases your risk of a miscarriage and other health problems during your pregnancy  Smoking can cause your baby to be born too early or weigh less at birth  Ask your healthcare provider for information if you need help quitting  · Do not drink alcohol  Alcohol passes from your body to your baby through the placenta   It can affect your baby's brain development and cause fetal alcohol syndrome (FAS)  FAS is a group of conditions that causes mental, behavior, and growth problems  · Talk to your healthcare provider before you take any medicines  Many medicines may harm your baby if you take them when you are pregnant  Do not take any medicines, vitamins, herbs, or supplements without first talking to your healthcare provider  Never use illegal or street drugs (such as marijuana or cocaine) while you are pregnant  What are some safety tips during pregnancy? · Avoid hot tubs and saunas  Do not use a hot tub or sauna while you are pregnant, especially during your first trimester  Hot tubs and saunas may raise your baby's temperature and increase the risk of birth defects  · Avoid toxoplasmosis  This is an infection caused by eating raw meat or being around infected cat feces  It can cause birth defects, miscarriages, and other problems  Wash your hands after you touch raw meat  Make sure any meat is well-cooked before you eat it  Avoid raw eggs and unpasteurized milk  Use gloves or ask someone else to clean your cat's litter box while you are pregnant  What changes are happening with my baby? By 34 weeks, your baby may weigh more than 5 pounds  Your baby will be about 12 ½ inches long from the top of the head to the rump (baby's bottom)  Your baby is gaining about ½ pound a week  Your baby's eyes open and close now  Your baby's kicks and movements are more forceful at this time  What do I need to know about prenatal care? Your healthcare provider will check your blood pressure and weight  You may also need the following:  · A urine test  may also be done to check for sugar and protein  These can be signs of gestational diabetes or infection  Protein in your urine may also be a sign of preeclampsia  Preeclampsia is a condition that can develop during week 20 or later of your pregnancy   It causes high blood pressure, and it can cause problems with your kidneys and other organs  · A Tdap vaccine  may be recommended by your healthcare provider  · Fundal height  is a measurement of your uterus to check your baby's growth  This number is usually the same as the number of weeks that you have been pregnant  Your healthcare provider may also check your baby's position  · Your baby's heart rate  will be checked  When should I seek immediate care? · You develop a severe headache that does not go away  · You have new or increased vision changes, such as blurred or spotted vision  · You have new or increased swelling in your face or hands  · You have vaginal spotting or bleeding  · Your water broke or you feel warm water gushing or trickling from your vagina  When should I contact my healthcare provider? · You have more than 5 contractions in 1 hour  · You notice any changes in your baby's movements  · You have abdominal cramps, pressure, or tightening  · You have a change in vaginal discharge  · You have chills or a fever  · You have vaginal itching, burning, or pain  · You have yellow, green, white, or foul-smelling vaginal discharge  · You have pain or burning when you urinate, less urine than usual, or pink or bloody urine  · You have questions or concerns about your condition or care  CARE AGREEMENT:   You have the right to help plan your care  Learn about your health condition and how it may be treated  Discuss treatment options with your caregivers to decide what care you want to receive  You always have the right to refuse treatment  The above information is an  only  It is not intended as medical advice for individual conditions or treatments  Talk to your doctor, nurse or pharmacist before following any medical regimen to see if it is safe and effective for you    © 2017 [facility] Information is for End User's use only and may not be sold, redistributed or otherwise used for commercial purposes  All illustrations and images included in CareNotes® are the copyrighted property of [employer]  or [employer]

## 2020-08-31 ENCOUNTER — ROUTINE PRENATAL (OUTPATIENT)
Dept: OBGYN CLINIC | Facility: CLINIC | Age: 25
End: 2020-08-31
Payer: COMMERCIAL

## 2020-08-31 VITALS
WEIGHT: 236.2 LBS | SYSTOLIC BLOOD PRESSURE: 120 MMHG | BODY MASS INDEX: 39.31 KG/M2 | DIASTOLIC BLOOD PRESSURE: 70 MMHG | TEMPERATURE: 97.3 F

## 2020-08-31 DIAGNOSIS — Z3A.33 33 WEEKS GESTATION OF PREGNANCY: Primary | ICD-10-CM

## 2020-08-31 LAB
SL AMB  POCT GLUCOSE, UA: NORMAL
SL AMB POCT URINE PROTEIN: NORMAL

## 2020-08-31 PROCEDURE — PNV: Performed by: OBSTETRICS & GYNECOLOGY

## 2020-08-31 PROCEDURE — 81002 URINALYSIS NONAUTO W/O SCOPE: CPT | Performed by: OBSTETRICS & GYNECOLOGY

## 2020-08-31 NOTE — PROGRESS NOTES
Patient seen today for prenatal visit  1  33 5 weeks-good fetal movement noted  Fetal movement and  labor precautions were reviewed  Follow-up 2 weeks prenatal, possible GBS if 36+ weeks  Patient counseled about this  2  Elevated BMI-still below 40, hold off on testing at this time  3  Previous yeast/nausea/seasonal allergies-no current issues

## 2020-08-31 NOTE — PATIENT INSTRUCTIONS
Pregnancy at 31 to [address]:   What changes are happening in my body? You may continue to have symptoms such as shortness of breath, heartburn, contractions, or swelling of your ankles and feet  You may be gaining about 1 pound a week now  How do I care for myself at this stage of my pregnancy? · Eat a variety of healthy foods  Healthy foods include fruits, vegetables, whole-grain breads, low-fat dairy foods, beans, lean meats, and fish  Drink liquids as directed  Ask how much liquid to drink each day and which liquids are best for you  Limit caffeine to less than 200 milligrams each day  Limit your intake of fish to 2 servings each week  Choose fish low in mercury such as canned light tuna, shrimp, salmon, cod, or tilapia  Do not  eat fish high in mercury such as swordfish, tilefish, buddy mackerel, and shark  · Manage heartburn  by eating 4 or 5 small meals each day instead of large meals  Avoid spicy food  · Manage swelling  by lying down and putting your feet up  · Take prenatal vitamins as directed  Your need for certain vitamins and minerals, such as folic acid, increases during pregnancy  Prenatal vitamins provide some of the extra vitamins and minerals you need  Prenatal vitamins may also help to decrease the risk of certain birth defects  · Talk to your healthcare provider about exercise  Moderate exercise can help you stay fit  Your healthcare provider will help you plan an exercise program that is safe for you during pregnancy  · Do not smoke  If you smoke, it is never too late to quit  Smoking increases your risk of a miscarriage and other health problems during your pregnancy  Smoking can cause your baby to be born too early or weigh less at birth  Ask your healthcare provider for information if you need help quitting  · Do not drink alcohol  Alcohol passes from your body to your baby through the placenta   It can affect your baby's brain development and cause fetal alcohol syndrome (FAS)  FAS is a group of conditions that causes mental, behavior, and growth problems  · Talk to your healthcare provider before you take any medicines  Many medicines may harm your baby if you take them when you are pregnant  Do not take any medicines, vitamins, herbs, or supplements without first talking to your healthcare provider  Never use illegal or street drugs (such as marijuana or cocaine) while you are pregnant  What are some safety tips during pregnancy? · Avoid hot tubs and saunas  Do not use a hot tub or sauna while you are pregnant, especially during your first trimester  Hot tubs and saunas may raise your baby's temperature and increase the risk of birth defects  · Avoid toxoplasmosis  This is an infection caused by eating raw meat or being around infected cat feces  It can cause birth defects, miscarriages, and other problems  Wash your hands after you touch raw meat  Make sure any meat is well-cooked before you eat it  Avoid raw eggs and unpasteurized milk  Use gloves or ask someone else to clean your cat's litter box while you are pregnant  What changes are happening with my baby? By 34 weeks, your baby may weigh more than 5 pounds  Your baby will be about 12 ½ inches long from the top of the head to the rump (baby's bottom)  Your baby is gaining about ½ pound a week  Your baby's eyes open and close now  Your baby's kicks and movements are more forceful at this time  What do I need to know about prenatal care? Your healthcare provider will check your blood pressure and weight  You may also need the following:  · A urine test  may also be done to check for sugar and protein  These can be signs of gestational diabetes or infection  Protein in your urine may also be a sign of preeclampsia  Preeclampsia is a condition that can develop during week 20 or later of your pregnancy   It causes high blood pressure, and it can cause problems with your kidneys and other organs  · A Tdap vaccine  may be recommended by your healthcare provider  · Fundal height  is a measurement of your uterus to check your baby's growth  This number is usually the same as the number of weeks that you have been pregnant  Your healthcare provider may also check your baby's position  · Your baby's heart rate  will be checked  When should I seek immediate care? · You develop a severe headache that does not go away  · You have new or increased vision changes, such as blurred or spotted vision  · You have new or increased swelling in your face or hands  · You have vaginal spotting or bleeding  · Your water broke or you feel warm water gushing or trickling from your vagina  When should I contact my healthcare provider? · You have more than 5 contractions in 1 hour  · You notice any changes in your baby's movements  · You have abdominal cramps, pressure, or tightening  · You have a change in vaginal discharge  · You have chills or a fever  · You have vaginal itching, burning, or pain  · You have yellow, green, white, or foul-smelling vaginal discharge  · You have pain or burning when you urinate, less urine than usual, or pink or bloody urine  · You have questions or concerns about your condition or care  CARE AGREEMENT:   You have the right to help plan your care  Learn about your health condition and how it may be treated  Discuss treatment options with your caregivers to decide what care you want to receive  You always have the right to refuse treatment  The above information is an  only  It is not intended as medical advice for individual conditions or treatments  Talk to your doctor, nurse or pharmacist before following any medical regimen to see if it is safe and effective for you    © 2017 [employer] Information is for End User's use only and may not be sold, redistributed or otherwise used for commercial purposes  All illustrations and images included in CareNotes® are the copyrighted property of [employer]  or Adi Lopez

## 2020-09-18 ENCOUNTER — ROUTINE PRENATAL (OUTPATIENT)
Dept: OBGYN CLINIC | Facility: CLINIC | Age: 25
End: 2020-09-18
Payer: COMMERCIAL

## 2020-09-18 VITALS
TEMPERATURE: 97.7 F | BODY MASS INDEX: 39.65 KG/M2 | HEIGHT: 65 IN | WEIGHT: 238 LBS | SYSTOLIC BLOOD PRESSURE: 132 MMHG | DIASTOLIC BLOOD PRESSURE: 88 MMHG

## 2020-09-18 DIAGNOSIS — Z3A.36 36 WEEKS GESTATION OF PREGNANCY: Primary | ICD-10-CM

## 2020-09-18 LAB
SL AMB  POCT GLUCOSE, UA: NEGATIVE
SL AMB POCT URINE PROTEIN: NEGATIVE

## 2020-09-18 PROCEDURE — 81002 URINALYSIS NONAUTO W/O SCOPE: CPT | Performed by: OBSTETRICS & GYNECOLOGY

## 2020-09-18 PROCEDURE — PNV: Performed by: OBSTETRICS & GYNECOLOGY

## 2020-09-18 PROCEDURE — 87653 STREP B DNA AMP PROBE: CPT | Performed by: OBSTETRICS & GYNECOLOGY

## 2020-09-18 NOTE — PROGRESS NOTES
Patient was seen today for prenatal visit  1  36 2 weeks-good fetal movement noted  Fetal movement and /labor precautions were reviewed  Group B strep was done  Advanced pregnancy guidelines and perineal massage were reviewed and paperwork given  Follow-up 1 week prenatal   2  Previous yeast/nausea/seasonal allergies-no current concerns  3  Elevated BMI-testing recommended if BMI greater than 40

## 2020-09-18 NOTE — PATIENT INSTRUCTIONS
Pregnancy at [address] to [address]:   What changes are happening in my body? You are considered full term at the beginning of 37 weeks  Your breathing may be easier if your baby has moved down into a head-down position  You may need to urinate more often because the baby may be pressing on your bladder  You may also feel more discomfort and get tired easily  How do I care for myself at this stage of my pregnancy? · Eat a variety of healthy foods  Healthy foods include fruits, vegetables, whole-grain breads, low-fat dairy foods, beans, lean meats, and fish  Drink liquids as directed  Ask how much liquid to drink each day and which liquids are best for you  Limit caffeine to less than 200 milligrams each day  Limit your intake of fish to 2 servings each week  Choose fish low in mercury such as canned light tuna, shrimp, salmon, cod, or tilapia  Do not  eat fish high in mercury such as swordfish, tilefish, buddy mackerel, and shark  · Take prenatal vitamins as directed  Your need for certain vitamins and minerals, such as folic acid, increases during pregnancy  Prenatal vitamins provide some of the extra vitamins and minerals you need  Prenatal vitamins may also help to decrease the risk of certain birth defects  · Rest as needed  Put your feet up if you have swelling in your ankles and feet  · Do not smoke  If you smoke, it is never too late to quit  Smoking increases your risk of a miscarriage and other health problems during your pregnancy  Smoking can cause your baby to be born too early or weigh less at birth  Ask your healthcare provider for information if you need help quitting  · Do not drink alcohol  Alcohol passes from your body to your baby through the placenta  It can affect your baby's brain development and cause fetal alcohol syndrome (FAS)  FAS is a group of conditions that causes mental, behavior, and growth problems       · Talk to your healthcare provider before you take any medicines  Many medicines may harm your baby if you take them when you are pregnant  Do not take any medicines, vitamins, herbs, or supplements without first talking to your healthcare provider  Never use illegal or street drugs (such as marijuana or cocaine) while you are pregnant  · Talk to your healthcare provider before you travel  You may not be able to travel in an airplane after 36 weeks  He may also recommend that you avoid long road trips  What are some safety tips during pregnancy? · Avoid hot tubs and saunas  Do not use a hot tub or sauna while you are pregnant, especially during your first trimester  Hot tubs and saunas may raise your baby's temperature and increase the risk of birth defects  · Avoid toxoplasmosis  This is an infection caused by eating raw meat or being around infected cat feces  It can cause birth defects, miscarriages, and other problems  Wash your hands after you touch raw meat  Make sure any meat is well-cooked before you eat it  Avoid raw eggs and unpasteurized milk  Use gloves or ask someone else to clean your cat's litter box while you are pregnant  · Ask your healthcare provider about travel  The most comfortable time to travel is during the second trimester  Ask your healthcare provider if you can travel after 36 weeks  You may not be able to travel in an airplane after 36 weeks  He may also recommend that you avoid long road trips  What changes are happening with my baby? By 38 weeks, your baby may weigh between 6 and 9 pounds  Your baby may be about 14 inches long from the top of the head to the rump (baby's bottom)  Your baby hears well enough to know your voice  As your baby gets larger, you may feel fewer kicks and more stretching and rolling  Your baby may move into a head-down position  Your baby will also rest lower in your abdomen  What do I need to know about prenatal care?   Your healthcare provider will check your blood pressure and weight  You may also need the following:  · A urine test  may also be done to check for sugar and protein  These can be signs of gestational diabetes or infection  Protein in your urine may also be a sign of preeclampsia  Preeclampsia is a condition that can develop during week 20 or later of your pregnancy  It causes high blood pressure, and it can cause problems with your kidneys and other organs  · A blood test  may be done to check for anemia (low iron level)  · A Tdap vaccine  may be recommended by your healthcare provider  · A group B strep test  is a test that is done to check for group B strep infection  Group B strep is a type of bacteria that may be found in the vagina or rectum  It can be passed to your baby during delivery if you have it  Your healthcare provider will take swab your vagina or rectum and send the sample to the lab for tests  · Fundal height  is a measurement of your uterus to check your baby's growth  This number is usually the same as the number of weeks that you have been pregnant  Your healthcare provider may also check your baby's position  · Your baby's heart rate  will be checked  When should I seek immediate care? · You develop a severe headache that does not go away  · You have new or increased vision changes, such as blurred or spotted vision  · You have new or increased swelling in your face or hands  · You have vaginal spotting or bleeding  · Your water broke or you feel warm water gushing or trickling from your vagina  When should I contact my healthcare provider? · You have more than 5 contractions in 1 hour  · You notice any changes in your baby's movements  · You have abdominal cramps, pressure, or tightening  · You have a change in vaginal discharge  · You have chills or a fever  · You have vaginal itching, burning, or pain  · You have yellow, green, white, or foul-smelling vaginal discharge      · You have pain or burning when you urinate, less urine than usual, or pink or bloody urine  · You have questions or concerns about your condition or care  CARE AGREEMENT:   You have the right to help plan your care  Learn about your health condition and how it may be treated  Discuss treatment options with your caregivers to decide what care you want to receive  You always have the right to refuse treatment  The above information is an  only  It is not intended as medical advice for individual conditions or treatments  Talk to your doctor, nurse or pharmacist before following any medical regimen to see if it is safe and effective for you  © 2017 [address] [employer] Information is for End User's use only and may not be sold, redistributed or otherwise used for commercial purposes  All illustrations and images included in CareNotes® are the copyrighted property of [employer]  or Adi Lopez

## 2020-09-20 LAB — GP B STREP DNA SPEC QL NAA+PROBE: ABNORMAL

## 2020-09-22 ENCOUNTER — ROUTINE PRENATAL (OUTPATIENT)
Dept: OBGYN CLINIC | Facility: CLINIC | Age: 25
End: 2020-09-22
Payer: COMMERCIAL

## 2020-09-22 VITALS
TEMPERATURE: 97.3 F | DIASTOLIC BLOOD PRESSURE: 84 MMHG | SYSTOLIC BLOOD PRESSURE: 116 MMHG | WEIGHT: 239 LBS | BODY MASS INDEX: 39.77 KG/M2

## 2020-09-22 DIAGNOSIS — B95.1 POSITIVE GBS TEST: ICD-10-CM

## 2020-09-22 DIAGNOSIS — Z3A.36 36 WEEKS GESTATION OF PREGNANCY: Primary | ICD-10-CM

## 2020-09-22 DIAGNOSIS — O99.213 OBESITY AFFECTING PREGNANCY IN THIRD TRIMESTER: ICD-10-CM

## 2020-09-22 LAB
SL AMB  POCT GLUCOSE, UA: NORMAL
SL AMB POCT URINE PROTEIN: NORMAL

## 2020-09-22 PROCEDURE — 81002 URINALYSIS NONAUTO W/O SCOPE: CPT | Performed by: OBSTETRICS & GYNECOLOGY

## 2020-09-22 PROCEDURE — PNV: Performed by: OBSTETRICS & GYNECOLOGY

## 2020-09-22 NOTE — PROGRESS NOTES
IUP at 36 weeks and 6 days patient reports positive fetal movement she does have occasional contractions denies leakage of fluid or bleeding  GBS is positive  Will need antibiotics in labor  Her blood pressure is normal today urine dip is negative and negative  Reviewed signs of labor and kick counts follow-up 1 week and p r n   All questions answered

## 2020-09-30 ENCOUNTER — ROUTINE PRENATAL (OUTPATIENT)
Dept: OBGYN CLINIC | Facility: CLINIC | Age: 25
End: 2020-09-30
Payer: COMMERCIAL

## 2020-09-30 VITALS
DIASTOLIC BLOOD PRESSURE: 80 MMHG | TEMPERATURE: 97.8 F | WEIGHT: 242 LBS | SYSTOLIC BLOOD PRESSURE: 118 MMHG | BODY MASS INDEX: 40.27 KG/M2

## 2020-09-30 DIAGNOSIS — B95.1 POSITIVE GBS TEST: ICD-10-CM

## 2020-09-30 DIAGNOSIS — O99.213 OBESITY AFFECTING PREGNANCY IN THIRD TRIMESTER: ICD-10-CM

## 2020-09-30 DIAGNOSIS — Z3A.38 38 WEEKS GESTATION OF PREGNANCY: Primary | ICD-10-CM

## 2020-09-30 LAB
SL AMB  POCT GLUCOSE, UA: NORMAL
SL AMB POCT URINE PROTEIN: NORMAL

## 2020-09-30 PROCEDURE — PNV: Performed by: OBSTETRICS & GYNECOLOGY

## 2020-09-30 PROCEDURE — 81002 URINALYSIS NONAUTO W/O SCOPE: CPT | Performed by: OBSTETRICS & GYNECOLOGY

## 2020-09-30 NOTE — PROGRESS NOTES
IUP at 38 weeks and 0 days  Patient reports positive fetal movement not having any real contractions denies leakage of fluid or bleeding  Blood pressure is normal weight gain +3 lb, urine dip is negative negative her cervix is unchanged fingertip 50% and -3 station reviewed signs of  labor and kick counts  She has been given the vocal advanced pregnancy guidelines and perineal massage  She will review those things and follow-up 1 week  She is GBS positive

## 2020-10-08 ENCOUNTER — ROUTINE PRENATAL (OUTPATIENT)
Dept: OBGYN CLINIC | Facility: CLINIC | Age: 25
End: 2020-10-08
Payer: COMMERCIAL

## 2020-10-08 VITALS
DIASTOLIC BLOOD PRESSURE: 88 MMHG | HEIGHT: 65 IN | BODY MASS INDEX: 41.42 KG/M2 | WEIGHT: 248.6 LBS | SYSTOLIC BLOOD PRESSURE: 148 MMHG | TEMPERATURE: 97.6 F

## 2020-10-08 DIAGNOSIS — Z23 NEED FOR INFLUENZA VACCINATION: Primary | ICD-10-CM

## 2020-10-08 DIAGNOSIS — Z3A.39 39 WEEKS GESTATION OF PREGNANCY: ICD-10-CM

## 2020-10-08 LAB
SL AMB  POCT GLUCOSE, UA: NORMAL
SL AMB POCT URINE PROTEIN: NORMAL

## 2020-10-08 PROCEDURE — PNV: Performed by: OBSTETRICS & GYNECOLOGY

## 2020-10-08 PROCEDURE — 90471 IMMUNIZATION ADMIN: CPT | Performed by: OBSTETRICS & GYNECOLOGY

## 2020-10-08 PROCEDURE — 90686 IIV4 VACC NO PRSV 0.5 ML IM: CPT | Performed by: OBSTETRICS & GYNECOLOGY

## 2020-10-08 PROCEDURE — 81002 URINALYSIS NONAUTO W/O SCOPE: CPT | Performed by: OBSTETRICS & GYNECOLOGY

## 2020-10-09 ENCOUNTER — HOSPITAL ENCOUNTER (INPATIENT)
Facility: HOSPITAL | Age: 25
LOS: 3 days | Discharge: HOME | End: 2020-10-12
Attending: OBSTETRICS & GYNECOLOGY | Admitting: OBSTETRICS & GYNECOLOGY
Payer: COMMERCIAL

## 2020-10-09 ENCOUNTER — HOSPITAL ENCOUNTER (OUTPATIENT)
Dept: LABOR AND DELIVERY | Facility: HOSPITAL | Age: 25
Discharge: HOME | End: 2020-10-09
Payer: COMMERCIAL

## 2020-10-09 DIAGNOSIS — Z3A.39 39 WEEKS GESTATION OF PREGNANCY: ICD-10-CM

## 2020-10-09 LAB
ABO GROUP BLD: NORMAL
BLD GP AB SCN SERPL QL: NEGATIVE
ERYTHROCYTE [DISTWIDTH] IN BLOOD BY AUTOMATED COUNT: 13.3 % (ref 11.6–15.1)
HCT VFR BLD AUTO: 36.9 % (ref 34.8–46.1)
HGB BLD-MCNC: 12 G/DL (ref 11.5–15.4)
MCH RBC QN AUTO: 29.3 PG (ref 26.8–34.3)
MCHC RBC AUTO-ENTMCNC: 32.5 G/DL (ref 31.4–37.4)
MCV RBC AUTO: 90 FL (ref 82–98)
PLATELET # BLD AUTO: 202 THOUSANDS/UL (ref 149–390)
PMV BLD AUTO: 11.4 FL (ref 8.9–12.7)
RBC # BLD AUTO: 4.09 MILLION/UL (ref 3.81–5.12)
RH BLD: POSITIVE
SPECIMEN EXPIRATION DATE: NORMAL
WBC # BLD AUTO: 11.01 THOUSAND/UL (ref 4.31–10.16)

## 2020-10-09 PROCEDURE — 85027 COMPLETE CBC AUTOMATED: CPT | Performed by: OBSTETRICS & GYNECOLOGY

## 2020-10-09 PROCEDURE — 86592 SYPHILIS TEST NON-TREP QUAL: CPT | Performed by: OBSTETRICS & GYNECOLOGY

## 2020-10-09 PROCEDURE — NC001 PR NO CHARGE: Performed by: OBSTETRICS & GYNECOLOGY

## 2020-10-09 PROCEDURE — 3E0P7VZ INTRODUCTION OF HORMONE INTO FEMALE REPRODUCTIVE, VIA NATURAL OR ARTIFICIAL OPENING: ICD-10-PCS | Performed by: OBSTETRICS & GYNECOLOGY

## 2020-10-09 PROCEDURE — 86900 BLOOD TYPING SEROLOGIC ABO: CPT | Performed by: OBSTETRICS & GYNECOLOGY

## 2020-10-09 PROCEDURE — 86850 RBC ANTIBODY SCREEN: CPT | Performed by: OBSTETRICS & GYNECOLOGY

## 2020-10-09 PROCEDURE — 4A1HXCZ MONITORING OF PRODUCTS OF CONCEPTION, CARDIAC RATE, EXTERNAL APPROACH: ICD-10-PCS | Performed by: OBSTETRICS & GYNECOLOGY

## 2020-10-09 PROCEDURE — 86901 BLOOD TYPING SEROLOGIC RH(D): CPT | Performed by: OBSTETRICS & GYNECOLOGY

## 2020-10-09 PROCEDURE — 80053 COMPREHEN METABOLIC PANEL: CPT | Performed by: OBSTETRICS & GYNECOLOGY

## 2020-10-09 RX ORDER — SODIUM CHLORIDE, SODIUM LACTATE, POTASSIUM CHLORIDE, CALCIUM CHLORIDE 600; 310; 30; 20 MG/100ML; MG/100ML; MG/100ML; MG/100ML
125 INJECTION, SOLUTION INTRAVENOUS CONTINUOUS
Status: DISCONTINUED | OUTPATIENT
Start: 2020-10-09 | End: 2020-10-11

## 2020-10-09 RX ORDER — ONDANSETRON 2 MG/ML
4 INJECTION INTRAMUSCULAR; INTRAVENOUS EVERY 6 HOURS PRN
Status: DISCONTINUED | OUTPATIENT
Start: 2020-10-09 | End: 2020-10-11

## 2020-10-09 RX ADMIN — MISOPROSTOL 25 MCG: 100 TABLET ORAL at 23:34

## 2020-10-09 RX ADMIN — SODIUM CHLORIDE 5 MILLION UNITS: 0.9 INJECTION, SOLUTION INTRAVENOUS at 22:39

## 2020-10-09 RX ADMIN — SODIUM CHLORIDE, SODIUM LACTATE, POTASSIUM CHLORIDE, AND CALCIUM CHLORIDE 125 ML/HR: .6; .31; .03; .02 INJECTION, SOLUTION INTRAVENOUS at 22:39

## 2020-10-10 ENCOUNTER — ANESTHESIA (INPATIENT)
Dept: ANESTHESIOLOGY | Facility: HOSPITAL | Age: 25
End: 2020-10-10
Payer: COMMERCIAL

## 2020-10-10 ENCOUNTER — ANESTHESIA EVENT (INPATIENT)
Dept: ANESTHESIOLOGY | Facility: HOSPITAL | Age: 25
End: 2020-10-10
Payer: COMMERCIAL

## 2020-10-10 LAB
ALBUMIN SERPL BCP-MCNC: 2.7 G/DL (ref 3.5–5)
ALP SERPL-CCNC: 181 U/L (ref 46–116)
ALT SERPL W P-5'-P-CCNC: 57 U/L (ref 12–78)
ANION GAP SERPL CALCULATED.3IONS-SCNC: 10 MMOL/L (ref 4–13)
AST SERPL W P-5'-P-CCNC: 30 U/L (ref 5–45)
BILIRUB SERPL-MCNC: 0.28 MG/DL (ref 0.2–1)
BUN SERPL-MCNC: 9 MG/DL (ref 5–25)
CALCIUM ALBUM COR SERPL-MCNC: 9.9 MG/DL (ref 8.3–10.1)
CALCIUM SERPL-MCNC: 8.9 MG/DL (ref 8.3–10.1)
CHLORIDE SERPL-SCNC: 104 MMOL/L (ref 100–108)
CO2 SERPL-SCNC: 23 MMOL/L (ref 21–32)
CREAT SERPL-MCNC: 0.83 MG/DL (ref 0.6–1.3)
GFR SERPL CREATININE-BSD FRML MDRD: 100 ML/MIN/1.73SQ M
GLUCOSE SERPL-MCNC: 84 MG/DL (ref 65–140)
POTASSIUM SERPL-SCNC: 3.8 MMOL/L (ref 3.5–5.3)
PROT SERPL-MCNC: 6.4 G/DL (ref 6.4–8.2)
SODIUM SERPL-SCNC: 137 MMOL/L (ref 136–145)

## 2020-10-10 PROCEDURE — 3E033VJ INTRODUCTION OF OTHER HORMONE INTO PERIPHERAL VEIN, PERCUTANEOUS APPROACH: ICD-10-PCS | Performed by: OBSTETRICS & GYNECOLOGY

## 2020-10-10 PROCEDURE — 10907ZC DRAINAGE OF AMNIOTIC FLUID, THERAPEUTIC FROM PRODUCTS OF CONCEPTION, VIA NATURAL OR ARTIFICIAL OPENING: ICD-10-PCS | Performed by: OBSTETRICS & GYNECOLOGY

## 2020-10-10 RX ORDER — ROPIVACAINE HYDROCHLORIDE 2 MG/ML
INJECTION, SOLUTION EPIDURAL; INFILTRATION; PERINEURAL
Status: COMPLETED
Start: 2020-10-10 | End: 2020-10-10

## 2020-10-10 RX ORDER — PROMETHAZINE HYDROCHLORIDE 25 MG/ML
INJECTION, SOLUTION INTRAMUSCULAR; INTRAVENOUS
Status: COMPLETED
Start: 2020-10-10 | End: 2020-10-10

## 2020-10-10 RX ORDER — ROPIVACAINE HYDROCHLORIDE 5 MG/ML
INJECTION, SOLUTION EPIDURAL; INFILTRATION; PERINEURAL AS NEEDED
Status: DISCONTINUED | OUTPATIENT
Start: 2020-10-10 | End: 2020-10-11 | Stop reason: HOSPADM

## 2020-10-10 RX ORDER — ROPIVACAINE HYDROCHLORIDE 2 MG/ML
INJECTION, SOLUTION EPIDURAL; INFILTRATION; PERINEURAL CONTINUOUS PRN
Status: DISCONTINUED | OUTPATIENT
Start: 2020-10-10 | End: 2020-10-11 | Stop reason: HOSPADM

## 2020-10-10 RX ORDER — LIDOCAINE HYDROCHLORIDE AND EPINEPHRINE 15; 5 MG/ML; UG/ML
INJECTION, SOLUTION EPIDURAL AS NEEDED
Status: DISCONTINUED | OUTPATIENT
Start: 2020-10-10 | End: 2020-10-11 | Stop reason: HOSPADM

## 2020-10-10 RX ORDER — OXYTOCIN/RINGER'S LACTATE 30/500 ML
1-30 PLASTIC BAG, INJECTION (ML) INTRAVENOUS
Status: DISCONTINUED | OUTPATIENT
Start: 2020-10-10 | End: 2020-10-11

## 2020-10-10 RX ORDER — BUTORPHANOL TARTRATE 1 MG/ML
INJECTION, SOLUTION INTRAMUSCULAR; INTRAVENOUS
Status: COMPLETED
Start: 2020-10-10 | End: 2020-10-10

## 2020-10-10 RX ADMIN — BUTORPHANOL TARTRATE 1 MG: 1 INJECTION, SOLUTION INTRAMUSCULAR; INTRAVENOUS at 00:59

## 2020-10-10 RX ADMIN — ROPIVACAINE HYDROCHLORIDE 5 ML: 5 INJECTION, SOLUTION EPIDURAL; INFILTRATION; PERINEURAL at 12:08

## 2020-10-10 RX ADMIN — ROPIVACAINE HYDROCHLORIDE: 2 INJECTION, SOLUTION EPIDURAL; INFILTRATION at 22:11

## 2020-10-10 RX ADMIN — ROPIVACAINE HYDROCHLORIDE 5 ML: 5 INJECTION, SOLUTION EPIDURAL; INFILTRATION; PERINEURAL at 12:13

## 2020-10-10 RX ADMIN — SODIUM CHLORIDE 2.5 MILLION UNITS: 9 INJECTION, SOLUTION INTRAVENOUS at 10:35

## 2020-10-10 RX ADMIN — PROMETHAZINE HYDROCHLORIDE 12.5 MG: 25 INJECTION INTRAMUSCULAR; INTRAVENOUS at 00:59

## 2020-10-10 RX ADMIN — SODIUM CHLORIDE 2.5 MILLION UNITS: 9 INJECTION, SOLUTION INTRAVENOUS at 18:47

## 2020-10-10 RX ADMIN — ONDANSETRON 4 MG: 2 INJECTION INTRAMUSCULAR; INTRAVENOUS at 00:40

## 2020-10-10 RX ADMIN — SODIUM CHLORIDE 2.5 MILLION UNITS: 9 INJECTION, SOLUTION INTRAVENOUS at 02:40

## 2020-10-10 RX ADMIN — SODIUM CHLORIDE 2.5 MILLION UNITS: 9 INJECTION, SOLUTION INTRAVENOUS at 06:36

## 2020-10-10 RX ADMIN — SODIUM CHLORIDE 2.5 MILLION UNITS: 9 INJECTION, SOLUTION INTRAVENOUS at 22:46

## 2020-10-10 RX ADMIN — SODIUM CHLORIDE, SODIUM LACTATE, POTASSIUM CHLORIDE, AND CALCIUM CHLORIDE 125 ML/HR: .6; .31; .03; .02 INJECTION, SOLUTION INTRAVENOUS at 06:40

## 2020-10-10 RX ADMIN — LIDOCAINE HYDROCHLORIDE AND EPINEPHRINE 5 ML: 15; 5 INJECTION, SOLUTION EPIDURAL at 12:07

## 2020-10-10 RX ADMIN — Medication 2 MILLI-UNITS/MIN: at 03:44

## 2020-10-10 RX ADMIN — SODIUM CHLORIDE, SODIUM LACTATE, POTASSIUM CHLORIDE, AND CALCIUM CHLORIDE 999 ML/HR: .6; .31; .03; .02 INJECTION, SOLUTION INTRAVENOUS at 12:03

## 2020-10-10 RX ADMIN — ROPIVACAINE HYDROCHLORIDE 10 ML/HR: 2 INJECTION, SOLUTION EPIDURAL; INFILTRATION at 12:13

## 2020-10-10 RX ADMIN — SODIUM CHLORIDE 2.5 MILLION UNITS: 9 INJECTION, SOLUTION INTRAVENOUS at 14:09

## 2020-10-11 LAB
BASE EXCESS BLDCOA CALC-SCNC: -4.6 MMOL/L (ref 3–11)
BASE EXCESS BLDCOV CALC-SCNC: -3.8 MMOL/L (ref 1–9)
CREAT UR-MCNC: 116.3 MG/DL
HCO3 BLDCOA-SCNC: 21.4 MMOL/L (ref 17.3–27.3)
HCO3 BLDCOV-SCNC: 20.9 MMOL/L (ref 12.2–28.6)
O2 CT VFR BLDCOA CALC: 8.5 ML/DL
OXYHGB MFR BLDCOA: 38.2 %
OXYHGB MFR BLDCOV: 74.3 %
PCO2 BLDCOA: 42.6 MM[HG] (ref 30–60)
PCO2 BLDCOV: 37.3 MM HG (ref 27–43)
PH BLDCOA: 7.32 [PH] (ref 7.23–7.43)
PH BLDCOV: 7.37 [PH] (ref 7.19–7.49)
PO2 BLDCOA: 19.4 MM HG (ref 5–25)
PO2 BLDCOV: 33.7 MM HG (ref 15–45)
PROT UR-MCNC: 16 MG/DL
PROT/CREAT UR: 0.14 MG/G{CREAT} (ref 0–0.1)
SAO2 % BLDCOV: 16.7 ML/DL

## 2020-10-11 PROCEDURE — 84156 ASSAY OF PROTEIN URINE: CPT | Performed by: OBSTETRICS & GYNECOLOGY

## 2020-10-11 PROCEDURE — 82570 ASSAY OF URINE CREATININE: CPT | Performed by: OBSTETRICS & GYNECOLOGY

## 2020-10-11 PROCEDURE — 99024 POSTOP FOLLOW-UP VISIT: CPT | Performed by: STUDENT IN AN ORGANIZED HEALTH CARE EDUCATION/TRAINING PROGRAM

## 2020-10-11 PROCEDURE — 0HQ9XZZ REPAIR PERINEUM SKIN, EXTERNAL APPROACH: ICD-10-PCS | Performed by: OBSTETRICS & GYNECOLOGY

## 2020-10-11 PROCEDURE — 82805 BLOOD GASES W/O2 SATURATION: CPT | Performed by: OBSTETRICS & GYNECOLOGY

## 2020-10-11 PROCEDURE — 59400 OBSTETRICAL CARE: CPT | Performed by: OBSTETRICS & GYNECOLOGY

## 2020-10-11 RX ORDER — ONDANSETRON 2 MG/ML
4 INJECTION INTRAMUSCULAR; INTRAVENOUS EVERY 8 HOURS PRN
Status: DISCONTINUED | OUTPATIENT
Start: 2020-10-11 | End: 2020-10-12 | Stop reason: HOSPADM

## 2020-10-11 RX ORDER — IBUPROFEN 600 MG/1
600 TABLET ORAL EVERY 4 HOURS PRN
Status: DISCONTINUED | OUTPATIENT
Start: 2020-10-11 | End: 2020-10-12 | Stop reason: HOSPADM

## 2020-10-11 RX ORDER — DIPHENHYDRAMINE HCL 25 MG
25 TABLET ORAL EVERY 6 HOURS PRN
Status: DISCONTINUED | OUTPATIENT
Start: 2020-10-11 | End: 2020-10-12 | Stop reason: HOSPADM

## 2020-10-11 RX ORDER — ACETAMINOPHEN 325 MG/1
650 TABLET ORAL EVERY 4 HOURS PRN
Status: DISCONTINUED | OUTPATIENT
Start: 2020-10-11 | End: 2020-10-12 | Stop reason: HOSPADM

## 2020-10-11 RX ORDER — SIMETHICONE 80 MG
80 TABLET,CHEWABLE ORAL 4 TIMES DAILY PRN
Status: DISCONTINUED | OUTPATIENT
Start: 2020-10-11 | End: 2020-10-12 | Stop reason: HOSPADM

## 2020-10-11 RX ORDER — OXYTOCIN/RINGER'S LACTATE 30/500 ML
250 PLASTIC BAG, INJECTION (ML) INTRAVENOUS CONTINUOUS
Status: DISCONTINUED | OUTPATIENT
Start: 2020-10-11 | End: 2020-10-12 | Stop reason: HOSPADM

## 2020-10-11 RX ORDER — CALCIUM CARBONATE 200(500)MG
1000 TABLET,CHEWABLE ORAL DAILY PRN
Status: DISCONTINUED | OUTPATIENT
Start: 2020-10-11 | End: 2020-10-12 | Stop reason: HOSPADM

## 2020-10-11 RX ORDER — DOCUSATE SODIUM 100 MG/1
100 CAPSULE, LIQUID FILLED ORAL 2 TIMES DAILY
Status: DISCONTINUED | OUTPATIENT
Start: 2020-10-11 | End: 2020-10-12 | Stop reason: HOSPADM

## 2020-10-11 RX ORDER — DIAPER,BRIEF,INFANT-TODD,DISP
1 EACH MISCELLANEOUS 4 TIMES DAILY PRN
Status: DISCONTINUED | OUTPATIENT
Start: 2020-10-11 | End: 2020-10-12 | Stop reason: HOSPADM

## 2020-10-11 RX ADMIN — BENZOCAINE AND LEVOMENTHOL 1 APPLICATION: 200; 5 SPRAY TOPICAL at 12:41

## 2020-10-11 RX ADMIN — ROPIVACAINE HYDROCHLORIDE: 2 INJECTION, SOLUTION EPIDURAL; INFILTRATION at 04:46

## 2020-10-11 RX ADMIN — IBUPROFEN 600 MG: 600 TABLET, FILM COATED ORAL at 12:42

## 2020-10-11 RX ADMIN — SODIUM CHLORIDE 2.5 MILLION UNITS: 9 INJECTION, SOLUTION INTRAVENOUS at 07:02

## 2020-10-11 RX ADMIN — SODIUM CHLORIDE, SODIUM LACTATE, POTASSIUM CHLORIDE, AND CALCIUM CHLORIDE 125 ML/HR: .6; .31; .03; .02 INJECTION, SOLUTION INTRAVENOUS at 05:42

## 2020-10-11 RX ADMIN — Medication 18 MILLI-UNITS/MIN: at 09:18

## 2020-10-11 RX ADMIN — SODIUM CHLORIDE 2.5 MILLION UNITS: 9 INJECTION, SOLUTION INTRAVENOUS at 02:42

## 2020-10-11 RX ADMIN — ROPIVACAINE HYDROCHLORIDE: 2 INJECTION, SOLUTION EPIDURAL; INFILTRATION at 10:55

## 2020-10-11 RX ADMIN — DOCUSATE SODIUM 100 MG: 100 CAPSULE, LIQUID FILLED ORAL at 17:24

## 2020-10-11 RX ADMIN — DOCUSATE SODIUM 100 MG: 100 CAPSULE, LIQUID FILLED ORAL at 12:41

## 2020-10-12 ENCOUNTER — TELEPHONE (OUTPATIENT)
Dept: OBGYN CLINIC | Facility: CLINIC | Age: 25
End: 2020-10-12

## 2020-10-12 VITALS
SYSTOLIC BLOOD PRESSURE: 118 MMHG | DIASTOLIC BLOOD PRESSURE: 68 MMHG | HEART RATE: 98 BPM | TEMPERATURE: 98.1 F | RESPIRATION RATE: 16 BRPM | OXYGEN SATURATION: 95 %

## 2020-10-12 LAB — RPR SER QL: NORMAL

## 2020-10-12 PROCEDURE — 99024 POSTOP FOLLOW-UP VISIT: CPT | Performed by: STUDENT IN AN ORGANIZED HEALTH CARE EDUCATION/TRAINING PROGRAM

## 2020-10-12 RX ORDER — ACETAMINOPHEN 325 MG/1
650 TABLET ORAL EVERY 6 HOURS PRN
Qty: 36 TABLET | Refills: 0 | Status: SHIPPED | OUTPATIENT
Start: 2020-10-12 | End: 2020-11-11

## 2020-10-12 RX ORDER — IBUPROFEN 600 MG/1
600 TABLET ORAL EVERY 6 HOURS PRN
Qty: 30 TABLET | Refills: 0 | Status: SHIPPED | OUTPATIENT
Start: 2020-10-12

## 2020-10-12 RX ADMIN — DOCUSATE SODIUM 100 MG: 100 CAPSULE, LIQUID FILLED ORAL at 09:28

## 2020-10-18 LAB — PLACENTA IN STORAGE: NORMAL

## 2020-10-23 ENCOUNTER — TELEPHONE (OUTPATIENT)
Dept: OBGYN CLINIC | Facility: CLINIC | Age: 25
End: 2020-10-23

## 2020-11-19 ENCOUNTER — POSTPARTUM VISIT (OUTPATIENT)
Dept: OBGYN CLINIC | Facility: CLINIC | Age: 25
End: 2020-11-19
Payer: COMMERCIAL

## 2020-11-19 ENCOUNTER — TELEPHONE (OUTPATIENT)
Dept: OBGYN CLINIC | Facility: CLINIC | Age: 25
End: 2020-11-19

## 2020-11-19 VITALS
TEMPERATURE: 97.2 F | DIASTOLIC BLOOD PRESSURE: 72 MMHG | BODY MASS INDEX: 36.89 KG/M2 | SYSTOLIC BLOOD PRESSURE: 122 MMHG | WEIGHT: 221.4 LBS | HEIGHT: 65 IN

## 2020-11-19 DIAGNOSIS — Z30.09 BIRTH CONTROL COUNSELING: ICD-10-CM

## 2020-11-19 LAB — SL AMB POCT HGB: 12.1

## 2020-11-19 PROCEDURE — 85018 HEMOGLOBIN: CPT | Performed by: OBSTETRICS & GYNECOLOGY

## 2020-11-19 PROCEDURE — 99024 POSTOP FOLLOW-UP VISIT: CPT | Performed by: OBSTETRICS & GYNECOLOGY

## 2020-12-15 ENCOUNTER — PROCEDURE VISIT (OUTPATIENT)
Dept: OBGYN CLINIC | Facility: CLINIC | Age: 25
End: 2020-12-15
Payer: COMMERCIAL

## 2020-12-15 VITALS
DIASTOLIC BLOOD PRESSURE: 82 MMHG | BODY MASS INDEX: 36.32 KG/M2 | WEIGHT: 218 LBS | SYSTOLIC BLOOD PRESSURE: 128 MMHG | HEIGHT: 65 IN

## 2020-12-15 DIAGNOSIS — Z30.430 ENCOUNTER FOR INSERTION OF MIRENA IUD: Primary | ICD-10-CM

## 2020-12-15 LAB — SL AMB POCT URINE HCG: NORMAL

## 2020-12-15 PROCEDURE — 81025 URINE PREGNANCY TEST: CPT | Performed by: OBSTETRICS & GYNECOLOGY

## 2020-12-15 PROCEDURE — 58300 INSERT INTRAUTERINE DEVICE: CPT | Performed by: OBSTETRICS & GYNECOLOGY

## 2021-01-22 ENCOUNTER — OFFICE VISIT (OUTPATIENT)
Dept: OBGYN CLINIC | Facility: CLINIC | Age: 26
End: 2021-01-22
Payer: COMMERCIAL

## 2021-01-22 VITALS
SYSTOLIC BLOOD PRESSURE: 110 MMHG | WEIGHT: 206.6 LBS | DIASTOLIC BLOOD PRESSURE: 72 MMHG | HEIGHT: 65 IN | BODY MASS INDEX: 34.42 KG/M2

## 2021-01-22 DIAGNOSIS — Z97.5 IUD (INTRAUTERINE DEVICE) IN PLACE: Primary | ICD-10-CM

## 2021-01-22 DIAGNOSIS — N92.6 IRREGULAR BLEEDING: ICD-10-CM

## 2021-01-22 PROBLEM — Z3A.39 39 WEEKS GESTATION OF PREGNANCY: Status: RESOLVED | Noted: 2020-04-16 | Resolved: 2021-01-22

## 2021-01-22 PROBLEM — O99.210 OBESITY AFFECTING PREGNANCY, ANTEPARTUM: Status: RESOLVED | Noted: 2020-06-08 | Resolved: 2021-01-22

## 2021-01-22 PROBLEM — Z36.86 ENCOUNTER FOR ANTENATAL SCREENING FOR CERVICAL LENGTH: Status: RESOLVED | Noted: 2020-06-09 | Resolved: 2021-01-22

## 2021-01-22 PROBLEM — Z36.3 ENCOUNTER FOR ANTENATAL SCREENING FOR MALFORMATIONS: Status: RESOLVED | Noted: 2020-06-09 | Resolved: 2021-01-22

## 2021-01-22 PROCEDURE — 3008F BODY MASS INDEX DOCD: CPT | Performed by: OBSTETRICS & GYNECOLOGY

## 2021-01-22 PROCEDURE — 99213 OFFICE O/P EST LOW 20 MIN: CPT | Performed by: OBSTETRICS & GYNECOLOGY

## 2021-01-22 NOTE — PATIENT INSTRUCTIONS
Intrauterine Device   WHAT YOU NEED TO KNOW:   What is an intrauterine device (IUD)? An IUD is a type of birth control that is inserted into your uterus  It is a small, flexible piece of plastic with a string on the end  It is inserted and removed by your healthcare provider  IUDs prevent sperm from reaching or fertilizing an egg  IUDs also prevent a fertilized egg from attaching to the uterus and developing into a fetus  What are the most common types of IUDs? Your healthcare provider will recommend the type of IUD that is right for you  This is based on your age and if you have had a child  If you have not had a child, a smaller IUD will be used  · A copper IUD  slowly releases a small amount of copper into your uterus  This IUD can remain in place for up to 10 years  · A hormone-releasing IUD  slowly releases a small amount of progesterone into your uterus  Progesterone is a hormone that is made by your body to help control your periods  This IUD can remain in place for 3 to 5 years  What are the advantages of an IUD? · An IUD is 98% to 99% effective in preventing pregnancy  · The IUD can be removed by your healthcare provider if you decide to have a baby  You may be able to get pregnant as soon as the IUD is removed  · An IUD protects you from pregnancy right after it is inserted  · You do not have to stop sexual activity to insert it  You do not have to remember to take your birth control pill  · Copper IUDs are safer for some women than oral birth control pills  Examples include women who smoke or have a history of blood clots  · Hormone-releasing IUDs may decrease certain health problems  Examples include bleeding and cramping that happen with your monthly period  What are the disadvantages of an IUD? · There is a small chance that you could get pregnant  Sometimes the IUD cannot be removed after you get pregnant   This increases your risk of a miscarriage or an ectopic pregnancy  Ectopic pregnancy is when the fertilized egg starts to grow somewhere other than your uterus  · An IUD does not protect you from sexually transmitted infections  · You may have cramps during the first weeks after you get the IUD  · A copper IUD may cause your monthly period to be heavier or more painful  This is more common within the first 3 months after you get the IUD  You may need to have your IUD removed if your bleeding or pain becomes severe  You may have spotting between periods  · There is a small risk of an infection within the first 20 days after the IUD is placed  Infection can lead to pelvic inflammatory disease  This can cause infertility  · Your uterus may tear when the IUD is inserted  The IUD may slip part or all of the way out of your uterus  How is the IUD inserted? · The IUD is usually inserted during your monthly period  This may help decrease the amount of discomfort you have during the procedure  It also helps make sure that you are not pregnant  You will be asked to lie down and place your feet in stirrups  Your healthcare provider will gently insert a speculum into your vagina  This is the same tool used during a Pap smear  The speculum allows your healthcare provider to see inside your vagina to your cervix  The cervix is the opening of your uterus  · Your healthcare provider will clean your cervix with an antiseptic solution to prevent infection  You may be given numbing medicine  A long plastic tube is gently passed through your cervix and into your uterus  This tube has the IUD inside of it  The IUD is pushed out of the tube and into your uterus  You may have cramps as the IUD is inserted  The tube is removed after the IUD is in place  How can I make sure my IUD is still in place? An IUD has a string made of plastic thread  One to 2 inches of this string hangs into your vagina   You cannot see this string, and it should not cause problems when you have sex  Check your IUD string every 3 days for the first 3 months after it is inserted  After that, check the string after each monthly period  Do the following to check the placement of your IUD:  · Wash your hands with soap and warm water  Dry them with a clean towel  · Bend your knees and squat low to the ground  · Gently put your index finger inside your vagina  The cervix is at the top of the vagina and feels like the tip of your nose  Feel for the IUD string  Do not pull on the string  You should not be able to feel the firm plastic of the IUD itself  · Wash your hands after you check your IUD string  Where can I find more information? · [facility] [address] [place]  Phone: [phone]  Web Address: https://Knok    When should I seek immediate care? · You have severe pain or bleeding during your period  · You have a fever and severe abdominal pain  When should I call my doctor? · You think you are pregnant  · The IUD has come out  · You have bleeding from your vagina after you have sex, and it is not your period  · You have pain during sex  · You cannot feel the IUD string, the string feels longer, or you feel the plastic of the IUD itself  · You have vaginal discharge that is green, yellow, or has a foul odor  · You have questions or concerns about your condition or care  CARE AGREEMENT:   You have the right to help plan your care  Learn about your health condition and how it may be treated  Discuss treatment options with your healthcare providers to decide what care you want to receive  You always have the right to refuse treatment  The above information is an  only  It is not intended as medical advice for individual conditions or treatments  Talk to your doctor, nurse or pharmacist before following any medical regimen to see if it is safe and effective for you    © Copyright [address] Information is for [employer] use only and may not be sold, redistributed or otherwise used for commercial purposes   All illustrations and images included in CareNotes® are the copyrighted property of A D A M , Inc  or [address] [place], [place] [address]kimberly marco a

## 2021-01-22 NOTE — PROGRESS NOTES
Assessment/Plan   Diagnoses and all orders for this visit:    IUD (intrauterine device) in place    Irregular bleeding    1  Mirena IUD check-in good position  IUD string seen at a length of 2 5 cm  Patient counseled about how to check for the string, seems inclined to probably not do so  She will call or return with any issues  2  Irregular bleeding-noted since Mirena IUD inserted  Patience was recommended, as irregular bleeding is common particularly in the 1st few months  She was counseled this could be followed by oligomenorrhea/amenorrhea going forward  3  Vaginal dryness-patient complained about this after exam   Did not see any evidence of infection or any issues other than the irregular bleeding/dark discharge likely related to the [address] IUD  She denies any vaginitis symptoms  She will continue to watch for this and call or return with any symptoms  Vaginal lubrication/moisturizer sheet was given and she will use accordingly  She has noted this for the 3 months since her delivery  To follow-up 2021 for yearly exam or as needed  Subjective   Patient ID: Claudia Gay is a 23 y o  female  Vitals:    21 0823   BP: 110/72     Patient was seen today for follow-up to IUD  Please see assessment plan for details        The following portions of the patient's history were reviewed and updated as appropriate: allergies, current medications, past family history, past medical history, past social history, past surgical history and problem list   Past Medical History:   Diagnosis Date    Coronavirus infection     tested positive 20, never had fever, cleared via telemedicine    History of PCOS     getting periods regularly now    Obesity, Class II, BMI 35-39 9     Varicella vaccine      Past Surgical History:   Procedure Laterality Date    EAR TUBE REMOVAL      WISDOM TOOTH EXTRACTION       OB History    Para Term  AB Living   1 1 1 0 0 1   SAB TAB Ectopic Multiple Live Births   0 0 0 0 1      # Outcome Date GA Lbr Alexi/2nd Weight Sex Delivery Anes PTL Lv   1 Term 10/11/20 39w4d / 03:12 3420 g (7 lb 8 6 oz) M Vag-Spont EPI  EMMETT       Current Outpatient Medications:     ibuprofen (MOTRIN) 600 mg tablet, Take 1 tablet (600 mg total) by mouth every 6 (six) hours as needed for mild pain (Patient not taking: Reported on 2021), Disp: 30 tablet, Rfl: 0    Prenatal Vit-Fe Fumarate-FA (PRENATAL VITAMINS PO), Take by mouth, Disp: , Rfl:   No Known Allergies  Social History     Socioeconomic History    Marital status: Single     Spouse name: Not on file    Number of children: Not on file    Years of education: Not on file    Highest education level: Not on file   Occupational History    Not on file   Social Needs    Financial resource strain: Not on file    Food insecurity     Worry: Not on file     Inability: Not on file    Transportation needs     Medical: Not on file     Non-medical: Not on file   Tobacco Use    Smoking status: Former Smoker     Quit date: 2019     Years since quittin 0    Smokeless tobacco: Current User   Substance and Sexual Activity    Alcohol use: Not Currently     Comment: rare    Drug use: Never    Sexual activity: Yes     Partners: Male     Birth control/protection: None, Abstinence   Lifestyle    Physical activity     Days per week: Not on file     Minutes per session: Not on file    Stress: Not on file   Relationships    Social connections     Talks on phone: Not on file     Gets together: Not on file     Attends Gnosticist service: Not on file     Active member of club or organization: Not on file     Attends meetings of clubs or organizations: Not on file     Relationship status: Not on file    Intimate partner violence     Fear of current or ex partner: Not on file     Emotionally abused: Not on file     Physically abused: Not on file     Forced sexual activity: Not on file   Other Topics Concern    Not on file Social History Narrative    Not on file     Family History   Problem Relation Age of Onset    Diabetes Paternal Grandmother     No Known Problems Mother         no preeclampsia    No Known Problems Father     Breast cancer Neg Hx     Colon cancer Neg Hx     Ovarian cancer Neg Hx     Uterine cancer Neg Hx        Review of Systems   Constitutional: Negative for chills, diaphoresis, fatigue and fever  Respiratory: Negative for apnea, cough, chest tightness, shortness of breath and wheezing  Cardiovascular: Negative for chest pain, palpitations and leg swelling  Gastrointestinal: Negative for abdominal distention, abdominal pain, anal bleeding, constipation, diarrhea, nausea, rectal pain and vomiting  Genitourinary: Negative for difficulty urinating, dyspareunia, dysuria, frequency, hematuria, menstrual problem, pelvic pain, urgency, vaginal bleeding, vaginal discharge and vaginal pain  Musculoskeletal: Negative for arthralgias, back pain and myalgias  Skin: Negative for color change and rash  Neurological: Negative for dizziness, syncope, light-headedness, numbness and headaches  Hematological: Negative for adenopathy  Does not bruise/bleed easily  Psychiatric/Behavioral: Negative for dysphoric mood and sleep disturbance  The patient is not nervous/anxious  Objective   Physical Exam    Objective      /72   Ht 5' 5" (1 651 m)   Wt 93 7 kg (206 lb 9 6 oz)   BMI 34 38 kg/m²     General:   alert and oriented, in no acute distress   Neck:    Breast:    Heart:    Lungs:    Abdomen: soft, non-tender, without masses or organomegaly   Vulva: normal   Vagina: Small amount of red/dark discharge, without erythema or lesions   Cervix: Small amount of red/dark discharge, without lesions or cervicitis  No Cervical motion tenderness  IUD string seen at a length of 2 5 cm      Uterus: top normal size, anteverted, non-tender   Adnexa: no mass, fullness, tenderness   Rectum: deferred Psych:  Normal mood and affect   Skin:  Without obvious lesions   Eyes: symmetric, with normal movements and reactivity   Musculoskeletal:  Normal muscle tone and movements appreciated

## 2021-05-24 ENCOUNTER — ANNUAL EXAM (OUTPATIENT)
Dept: OBGYN CLINIC | Facility: CLINIC | Age: 26
End: 2021-05-24
Payer: COMMERCIAL

## 2021-05-24 VITALS
WEIGHT: 209 LBS | DIASTOLIC BLOOD PRESSURE: 86 MMHG | SYSTOLIC BLOOD PRESSURE: 122 MMHG | BODY MASS INDEX: 34.82 KG/M2 | HEIGHT: 65 IN

## 2021-05-24 DIAGNOSIS — Z01.419 WOMEN'S ANNUAL ROUTINE GYNECOLOGICAL EXAMINATION: Primary | ICD-10-CM

## 2021-05-24 DIAGNOSIS — Z97.5 IUD (INTRAUTERINE DEVICE) IN PLACE: ICD-10-CM

## 2021-05-24 PROCEDURE — 99395 PREV VISIT EST AGE 18-39: CPT | Performed by: OBSTETRICS & GYNECOLOGY

## 2021-05-24 PROCEDURE — 3008F BODY MASS INDEX DOCD: CPT | Performed by: OBSTETRICS & GYNECOLOGY

## 2021-05-24 NOTE — PROGRESS NOTES
Assessment/Plan   Diagnoses and all orders for this visit:    Women's annual routine gynecological examination    IUD (intrauterine device) in place    1  Yearly exam-Pap smear deferred, self-breast awareness reviewed  2  Mirena IUD-inserted on 12/15/20  It is in good position on exam, string is 2 cm in length  She will call with any issues  3  Vaginal bleeding-mostly notes monthly menses  Is lighter  Did have episode of spotting for about 2 weeks time recently  She will call with any menometrorrhagia going forward  4  Vaginal dryness-still notes this sporadically with itching  She did go [facility], had GC/chlamydia testing which was negative  Told she had negative testing for BV as well  No vaginitis was seen today  Vaginal lubrication/moisturizer sheet was given  Consider Replens or Luvena twice weekly and lubrication with sex as needed  Should this persist, would recommend she return at a time when she is not at the end of her menses so we can re-evaluate  5  STD-testing negative last week at [facility] by patient report  Denies need for testing today  6  Katy Pérez is 9month-old, weighs 26 lb  My congratulations were given  Follow-up 1 year for yearly exam or as needed  Subjective   Patient ID: Carrol Hernandez is a 23 y o  female  Vitals:    05/24/21 0806   BP: 122/86     Patient was seen today for yearly exam   Please see assessment plan for details        The following portions of the patient's history were reviewed and updated as appropriate: allergies, current medications, past family history, past medical history, past social history, past surgical history and problem list   Past Medical History:   Diagnosis Date    Coronavirus infection     tested positive 4/20/20, never had fever, cleared via telemedicine    History of PCOS     getting periods regularly now    Obesity, Class II, BMI 35-39 9     Varicella vaccine      Past Surgical History:   Procedure Laterality Date    EAR TUBE REMOVAL      WISDOM TOOTH EXTRACTION       OB History    Para Term  AB Living   1 1 1 0 0 1   SAB TAB Ectopic Multiple Live Births   0 0 0 0 1      # Outcome Date GA Lbr Alexi/2nd Weight Sex Delivery Anes PTL Lv   1 Term 10/11/20 39w4d / 03:12 3420 g (7 lb 8 6 oz) M Vag-Spont EPI  EMMETT       Current Outpatient Medications:     ibuprofen (MOTRIN) 600 mg tablet, Take 1 tablet (600 mg total) by mouth every 6 (six) hours as needed for mild pain (Patient not taking: Reported on 2021), Disp: 30 tablet, Rfl: 0    Prenatal Vit-Fe Fumarate-FA (PRENATAL VITAMINS PO), Take by mouth, Disp: , Rfl:   No Known Allergies  Social History     Socioeconomic History    Marital status: Single     Spouse name: None    Number of children: None    Years of education: None    Highest education level: None   Occupational History    None   Social Needs    Financial resource strain: None    Food insecurity     Worry: None     Inability: None    Transportation needs     Medical: None     Non-medical: None   Tobacco Use    Smoking status: Former Smoker     Quit date: 2019     Years since quittin 3    Smokeless tobacco: Current User   Substance and Sexual Activity    Alcohol use: Yes     Frequency: Monthly or less     Comment: rare    Drug use: Never    Sexual activity: Not Currently     Partners: Male     Birth control/protection: None, Abstinence   Lifestyle    Physical activity     Days per week: None     Minutes per session: None    Stress: None   Relationships    Social connections     Talks on phone: None     Gets together: None     Attends Judaism service: None     Active member of club or organization: None     Attends meetings of clubs or organizations: None     Relationship status: None    Intimate partner violence     Fear of current or ex partner: None     Emotionally abused: None     Physically abused: None     Forced sexual activity: None   Other Topics Concern    None   Social History Narrative    None     Family History   Problem Relation Age of Onset    Diabetes Paternal Grandmother     No Known Problems Mother         no preeclampsia    No Known Problems Father     Breast cancer Neg Hx     Colon cancer Neg Hx     Ovarian cancer Neg Hx     Uterine cancer Neg Hx        Review of Systems   Constitutional: Negative for chills, diaphoresis, fatigue and fever  Respiratory: Negative for apnea, cough, chest tightness, shortness of breath and wheezing  Cardiovascular: Negative for chest pain, palpitations and leg swelling  Gastrointestinal: Negative for abdominal distention, abdominal pain, anal bleeding, constipation, diarrhea, nausea, rectal pain and vomiting  Genitourinary: Negative for difficulty urinating, dyspareunia, dysuria, frequency, hematuria, menstrual problem, pelvic pain, urgency, vaginal bleeding, vaginal discharge and vaginal pain  Musculoskeletal: Negative for arthralgias, back pain and myalgias  Skin: Negative for color change and rash  Neurological: Negative for dizziness, syncope, light-headedness, numbness and headaches  Hematological: Negative for adenopathy  Does not bruise/bleed easily  Psychiatric/Behavioral: Negative for dysphoric mood and sleep disturbance  The patient is not nervous/anxious  Objective   Physical Exam    Objective      /86 (BP Location: Left arm, Patient Position: Sitting, Cuff Size: Adult)   Ht 5' 5" (1 651 m)   Wt 94 8 kg (209 lb)   LMP 05/17/2021   BMI 34 78 kg/m²     General:   alert and oriented, in no acute distress   Neck: normal to inspection and palpation   Breast: normal appearance, no masses or tenderness   Heart:    Lungs:    Abdomen: soft, non-tender, without masses or organomegaly   Vulva: normal   Vagina: Small amount red/brown discharge, without erythema or lesions noted  Cervix: Small amount of red/brown discharge, without lesions or cervicitis    No Cervical motion tenderness    IUD string seen at a length of 2 cm   Uterus: top normal size, anteverted, non-tender   Adnexa: no mass, fullness, tenderness   Rectum: negative    Psych:  Normal mood and affect   Skin:  Without obvious lesions   Eyes: symmetric, with normal movements and reactivity   Musculoskeletal:  Normal muscle tone and movements appreciated

## 2021-05-24 NOTE — PATIENT INSTRUCTIONS
Intrauterine Device   WHAT YOU NEED TO KNOW:   What is an intrauterine device (IUD)? An IUD is a type of birth control that is inserted into your uterus  It is a small, flexible piece of plastic with a string on the end  It is inserted and removed by your healthcare provider  IUDs prevent sperm from reaching or fertilizing an egg  IUDs also prevent a fertilized egg from attaching to the uterus and developing into a fetus  What are the most common types of IUDs? Your healthcare provider will recommend the type of IUD that is right for you  This is based on your age and if you have had a child  If you have not had a child, a smaller IUD will be used  · A copper IUD  slowly releases a small amount of copper into your uterus  This IUD can remain in place for up to 10 years  · A hormone-releasing IUD  slowly releases a small amount of progesterone into your uterus  Progesterone is a hormone that is made by your body to help control your periods  This IUD can remain in place for 3 to 5 years  What are the advantages of an IUD? · An IUD is 98% to 99% effective in preventing pregnancy  · The IUD can be removed by your healthcare provider if you decide to have a baby  You may be able to get pregnant as soon as the IUD is removed  · An IUD protects you from pregnancy right after it is inserted  · You do not have to stop sexual activity to insert it  You do not have to remember to take your birth control pill  · Copper IUDs are safer for some women than oral birth control pills  Examples include women who smoke or have a history of blood clots  · Hormone-releasing IUDs may decrease certain health problems  Examples include bleeding and cramping that happen with your monthly period  What are the disadvantages of an IUD? · There is a small chance that you could get pregnant  Sometimes the IUD cannot be removed after you get pregnant   This increases your risk of a miscarriage or an ectopic pregnancy  Ectopic pregnancy is when the fertilized egg starts to grow somewhere other than your uterus  · An IUD does not protect you from sexually transmitted infections  · You may have cramps during the first weeks after you get the IUD  · A copper IUD may cause your monthly period to be heavier or more painful  This is more common within the first 3 months after you get the IUD  You may need to have your IUD removed if your bleeding or pain becomes severe  You may have spotting between periods  · There is a small risk of an infection within the first 20 days after the IUD is placed  Infection can lead to pelvic inflammatory disease  This can cause infertility  · Your uterus may tear when the IUD is inserted  The IUD may slip part or all of the way out of your uterus  How is the IUD inserted? · The IUD is usually inserted during your monthly period  This may help decrease the amount of discomfort you have during the procedure  It also helps make sure that you are not pregnant  You will be asked to lie down and place your feet in stirrups  Your healthcare provider will gently insert a speculum into your vagina  This is the same tool used during a Pap smear  The speculum allows your healthcare provider to see inside your vagina to your cervix  The cervix is the opening of your uterus  · Your healthcare provider will clean your cervix with an antiseptic solution to prevent infection  You may be given numbing medicine  A long plastic tube is gently passed through your cervix and into your uterus  This tube has the IUD inside of it  The IUD is pushed out of the tube and into your uterus  You may have cramps as the IUD is inserted  The tube is removed after the IUD is in place  How can I make sure my IUD is still in place? An IUD has a string made of plastic thread  One to 2 inches of this string hangs into your vagina   You cannot see this string, and it should not cause problems when you have sex  Check your IUD string every 3 days for the first 3 months after it is inserted  After that, check the string after each monthly period  Do the following to check the placement of your IUD:  · Wash your hands with soap and warm water  Dry them with a clean towel  · Bend your knees and squat low to the ground  · Gently put your index finger inside your vagina  The cervix is at the top of the vagina and feels like the tip of your nose  Feel for the IUD string  Do not pull on the string  You should not be able to feel the firm plastic of the IUD itself  · Wash your hands after you check your IUD string  Where can I find more information? · [facility] [address] [place]  Phone: 7- [phone]  Web Address: https://[employer]    When should I seek immediate care? · You have severe pain or bleeding during your period  · You have a fever and severe abdominal pain  When should I call my doctor? · You think you are pregnant  · The IUD has come out  · You have bleeding from your vagina after you have sex, and it is not your period  · You have pain during sex  · You cannot feel the IUD string, the string feels longer, or you feel the plastic of the IUD itself  · You have vaginal discharge that is green, yellow, or has a foul odor  · You have questions or concerns about your condition or care  CARE AGREEMENT:   You have the right to help plan your care  Learn about your health condition and how it may be treated  Discuss treatment options with your healthcare providers to decide what care you want to receive  You always have the right to refuse treatment  The above information is an  only  It is not intended as medical advice for individual conditions or treatments  Talk to your doctor, nurse or pharmacist before following any medical regimen to see if it is safe and effective for you    © [address] [place] [address] 2021 Information is for End User's use only and may not be sold, redistributed or otherwise used for commercial purposes   All illustrations and images included in CareNotes® are the copyrighted property of A D A M , Inc  or [address] [place], [place] [address]kimberly marco a

## 2022-05-26 ENCOUNTER — ANNUAL EXAM (OUTPATIENT)
Dept: OBGYN CLINIC | Facility: CLINIC | Age: 27
End: 2022-05-26
Payer: COMMERCIAL

## 2022-05-26 VITALS
DIASTOLIC BLOOD PRESSURE: 78 MMHG | BODY MASS INDEX: 30.99 KG/M2 | WEIGHT: 186 LBS | HEIGHT: 65 IN | SYSTOLIC BLOOD PRESSURE: 118 MMHG

## 2022-05-26 DIAGNOSIS — Z01.419 WOMEN'S ANNUAL ROUTINE GYNECOLOGICAL EXAMINATION: Primary | ICD-10-CM

## 2022-05-26 DIAGNOSIS — Z12.4 CERVICAL CANCER SCREENING: ICD-10-CM

## 2022-05-26 DIAGNOSIS — Z97.5 IUD (INTRAUTERINE DEVICE) IN PLACE: ICD-10-CM

## 2022-05-26 DIAGNOSIS — B37.3 VAGINAL CANDIDIASIS: ICD-10-CM

## 2022-05-26 LAB
BV WHIFF TEST VAG QL: NEGATIVE
CLUE CELLS SPEC QL WET PREP: NEGATIVE
PH SMN: ABNORMAL [PH]
SL AMB POCT WET MOUNT: YES
T VAGINALIS VAG QL WET PREP: NEGATIVE
YEAST VAG QL WET PREP: ABNORMAL

## 2022-05-26 PROCEDURE — 99395 PREV VISIT EST AGE 18-39: CPT | Performed by: OBSTETRICS & GYNECOLOGY

## 2022-05-26 PROCEDURE — 87210 SMEAR WET MOUNT SALINE/INK: CPT | Performed by: OBSTETRICS & GYNECOLOGY

## 2022-05-26 NOTE — PROGRESS NOTES
Assessment/Plan   Diagnoses and all orders for this visit:    Women's annual routine gynecological examination    IUD (intrauterine device) in place    Vaginal candidiasis  -     POCT wet mount    Cervical cancer screening  -     IGP,CtNg,AptimaHPV,rfx16/18,45    1  Yearly exam-Pap smear done with reflex HPV, self-breast awareness reviewed  2  Mirena IUD-inserted on 12/15/20  It is in good position on exam, string is 1 5 cm  She does continue note monthly menses  To call or return with any irregular menses  3  Vaginal discharge/yeast-[facility] and again about 2-3 months ago  Each time, was diagnosed with yeast infection, treated with fluconazole p o  X1 with no improvement  Exam/wet prep today with small white discharge, wet prep with small amount of yeast without any other suspicious findings  GC/chlamydia done today await results  Did discuss treatment options  She may consider OTC miconazole 3 or 7  Additionally, suggested she might try Replens or refresh, moisturizer/lubrication sheet given  She will call or return with any worsening symptoms  4  STD-with same partner for years  Given the discharge, GC/chlamydia done today, we will inform the patient of the findings  5  Toro Reveal, is now 19 months  My congratulations were given  Follow-up 1 year for yearly exam as needed  Subjective   Patient ID: Alayna Jhaveri is a 27 y o  female  Vitals:    05/26/22 0805   BP: 118/78     Patient was seen today for yearly exam   Please see assessment plan for details        The following portions of the patient's history were reviewed and updated as appropriate: allergies, current medications, past family history, past medical history, past social history, past surgical history and problem list   Past Medical History:   Diagnosis Date    Coronavirus infection     tested positive 4/20/20, never had fever, cleared via telemedicine    History of PCOS getting periods regularly now    Obesity, Class II, BMI 35-39 9     Varicella vaccine      Past Surgical History:   Procedure Laterality Date    EAR TUBE REMOVAL      WISDOM TOOTH EXTRACTION       OB History    Para Term  AB Living   1 1 1 0 0 1   SAB IAB Ectopic Multiple Live Births   0 0 0 0 1      # Outcome Date GA Lbr Alexi/2nd Weight Sex Delivery Anes PTL Lv   1 Term 10/11/20 39w4d / 03:12 3420 g (7 lb 8 6 oz) M Vag-Spont EPI  EMMETT       Current Outpatient Medications:     ibuprofen (MOTRIN) 600 mg tablet, Take 1 tablet (600 mg total) by mouth every 6 (six) hours as needed for mild pain, Disp: 30 tablet, Rfl: 0    Prenatal Vit-Fe Fumarate-FA (PRENATAL VITAMINS PO), Take by mouth (Patient not taking: Reported on 2022), Disp: , Rfl:   No Known Allergies  Social History     Socioeconomic History    Marital status: Single     Spouse name: None    Number of children: None    Years of education: None    Highest education level: None   Occupational History    None   Tobacco Use    Smoking status: Former Smoker     Quit date: 2019     Years since quitting: 3 3    Smokeless tobacco: Current User   Vaping Use    Vaping Use: Never used   Substance and Sexual Activity    Alcohol use: Yes     Comment: rare    Drug use: Never    Sexual activity: Yes     Partners: Male     Birth control/protection: None   Other Topics Concern    None   Social History Narrative    None     Social Determinants of Health     Financial Resource Strain: Not on file   Food Insecurity: Not on file   Transportation Needs: Not on file   Physical Activity: Not on file   Stress: Not on file   Social Connections: Not on file   Intimate Partner Violence: Not on file   Housing Stability: Not on file     Family History   Problem Relation Age of Onset    Diabetes Paternal Grandmother     No Known Problems Mother         no preeclampsia    No Known Problems Father     Breast cancer Neg Hx     Colon cancer Neg Hx  Ovarian cancer Neg Hx     Uterine cancer Neg Hx        Review of Systems   Constitutional: Negative for chills, diaphoresis, fatigue and fever  Respiratory: Negative for apnea, cough, chest tightness, shortness of breath and wheezing  Cardiovascular: Negative for chest pain, palpitations and leg swelling  Gastrointestinal: Negative for abdominal distention, abdominal pain, anal bleeding, constipation, diarrhea, nausea, rectal pain and vomiting  Genitourinary: Negative for difficulty urinating, dyspareunia, dysuria, frequency, hematuria, menstrual problem, pelvic pain, urgency, vaginal bleeding, vaginal discharge and vaginal pain  Musculoskeletal: Negative for arthralgias, back pain and myalgias  Skin: Negative for color change and rash  Neurological: Negative for dizziness, syncope, light-headedness, numbness and headaches  Hematological: Negative for adenopathy  Does not bruise/bleed easily  Psychiatric/Behavioral: Negative for dysphoric mood and sleep disturbance  The patient is not nervous/anxious  Objective   Physical Exam  OBGyn Exam     Objective      /78 (BP Location: Left arm, Patient Position: Sitting, Cuff Size: Adult)   Ht 5' 5" (1 651 m)   Wt 84 4 kg (186 lb)   LMP 05/05/2022   BMI 30 95 kg/m²     General:   alert and oriented, in no acute distress   Neck: normal to inspection and palpation   Breast: normal appearance, no masses or tenderness   Heart:    Lungs:    Abdomen: soft, non-tender, without masses or organomegaly   Vulva: normal   Vagina: Small amount of white discharge, without erythema or lesions  Cervix: Small amount of white discharge, without lesions or cervicitis or Cervical motion tenderness    IUD string seen at a length of 1 5 cm   Uterus: top normal size, anteverted, non-tender   Adnexa: no mass, fullness, tenderness   Rectum: negative    Psych:  Normal mood and affect   Skin:  Without obvious lesions   Eyes: symmetric, with normal movements and reactivity   Musculoskeletal:  Normal muscle tone and movements appreciated

## 2022-05-28 LAB
C TRACH RRNA CVX QL NAA+PROBE: NEGATIVE
CYTOLOGIST CVX/VAG CYTO: NORMAL
DX ICD CODE: NORMAL
HPV I/H RISK 4 DNA CVX QL PROBE+SIG AMP: NEGATIVE
N GONORRHOEA RRNA CVX QL NAA+PROBE: NEGATIVE
OTHER STN SPEC: NORMAL
PATH REPORT.FINAL DX SPEC: NORMAL
SL AMB NOTE:: NORMAL
SL AMB SPECIMEN ADEQUACY: NORMAL
SL AMB TEST METHODOLOGY: NORMAL

## 2023-03-17 ENCOUNTER — PROCEDURE VISIT (OUTPATIENT)
Dept: GYNECOLOGY | Facility: CLINIC | Age: 28
End: 2023-03-17

## 2023-03-17 VITALS
WEIGHT: 176.2 LBS | DIASTOLIC BLOOD PRESSURE: 64 MMHG | HEIGHT: 65 IN | BODY MASS INDEX: 29.36 KG/M2 | SYSTOLIC BLOOD PRESSURE: 112 MMHG

## 2023-03-17 DIAGNOSIS — Z30.432 ENCOUNTER FOR IUD REMOVAL: Primary | ICD-10-CM

## 2023-03-17 DIAGNOSIS — Z31.69 ENCOUNTER FOR PRECONCEPTION CONSULTATION: ICD-10-CM

## 2023-03-17 PROBLEM — Z97.5 IUD (INTRAUTERINE DEVICE) IN PLACE: Status: RESOLVED | Noted: 2021-01-22 | Resolved: 2023-03-17

## 2023-03-17 RX ORDER — CHOLECALCIFEROL (VITAMIN D3) 25 MCG
1 TABLET,CHEWABLE ORAL DAILY
Qty: 90 CAPSULE | Refills: 3 | Status: SHIPPED | OUTPATIENT
Start: 2023-03-17

## 2023-03-17 NOTE — PROGRESS NOTES
Iud removal    Date/Time: 3/17/2023 2:03 PM  Performed by: Tali Sow MD  Authorized by: Tali Sow MD   Universal Protocol:  Consent: Verbal consent obtained  Written consent obtained  Risks and benefits: risks, benefits and alternatives were discussed  Consent given by: patient  Time out: Immediately prior to procedure a "time out" was called to verify the correct patient, procedure, equipment, support staff and site/side marked as required  Timeout called at: 3/17/2023 2:03 PM   Patient understanding: patient states understanding of the procedure being performed  Patient consent: the patient's understanding of the procedure matches consent given  Procedure consent: procedure consent matches procedure scheduled  Relevant documents: relevant documents present and verified  Test results: test results available and properly labeled  Patient identity confirmed: verbally with patient      Procedure:     Removed with no complications: yes      Other reason for removal:  Pregnancy attempt  Comments:      Speculum placed  IUD string seen at a length of 2 cm  Grasped with Mahi clamp removed without difficulty  Patient tolerated procedure well

## 2023-03-17 NOTE — PROGRESS NOTES
Assessment/Plan   Diagnoses and all orders for this visit:    Encounter for IUD removal  -     Iud removal    Encounter for preconception consultation  -     Prenatal Vit-Fe Fum-FA-Omega (Prenatal Multi +DHA) 27-0 8-228 MG CAPS; Take 1 capsule by mouth daily    1  Mirena IUD removal- was inserted 12/15/2020, IUD strings in a length of 1 5 to 2 cm today  Was removed today at patient request as she wishes to attempt pregnancy  IUD string was grasped and IUD removed without incident  Patient tolerated the procedure well with minimal cramping  She was counseled about possible irregular bleeding and then onset of her normal menstrual cycles  She will call or return with any issues  2  previous yeast-no current concerns with patient's symptoms or on exam   3  Preconceptual-plans to attempt pregnancy soon  Recommend avoidance of smoking, drinking, drugs, denies birth defects in her or her partner's family  Recommend up-to-date with vaccinations  Recommend folic acid  Electronic prescription for prenatal vitamins sent to [facility] at her request   Call or return with positive pregnancy test   Patient counseled that we do not deliver babies at this practice  She was given the OB transition sheet and she will consider accordingly  Dr Ammon Leach did deliver her last baby  Follow-up after 5/26/2023 for yearly exam or as needed  ED-  Subjective   Patient ID: Saroj Harper is a 24 y o  female      Vitals:    03/17/23 1351   BP: 112/64     HPI    The following portions of the patient's history were reviewed and updated as appropriate: allergies, current medications, past family history, past medical history, past social history, past surgical history and problem list   Past Medical History:   Diagnosis Date   • Coronavirus infection     tested positive 4/20/20, never had fever, cleared via telemedicine   • History of PCOS     getting periods regularly now   • Obesity, Class II, BMI 35-39 9    • Varicella vaccine      Past Surgical History:   Procedure Laterality Date   • EAR TUBE REMOVAL     • WISDOM TOOTH EXTRACTION       OB History    Para Term  AB Living   1 1 1 0 0 1   SAB IAB Ectopic Multiple Live Births   0 0 0 0 1      # Outcome Date GA Lbr Alexi/2nd Weight Sex Delivery Anes PTL Lv   1 Term 10/11/20 39w4d / 03:12 3420 g (7 lb 8 6 oz) M Vag-Spont EPI  EMMETT       Current Outpatient Medications:   •  ibuprofen (MOTRIN) 600 mg tablet, Take 1 tablet (600 mg total) by mouth every 6 (six) hours as needed for mild pain, Disp: 30 tablet, Rfl: 0  •  Prenatal Vit-Fe Fum-FA-Omega (Prenatal Multi +DHA) 27-0 8-228 MG CAPS, Take 1 capsule by mouth daily, Disp: 90 capsule, Rfl: 3  •  Prenatal Vit-Fe Fumarate-FA (PRENATAL VITAMINS PO), Take by mouth (Patient not taking: Reported on 2022), Disp: , Rfl:   No Known Allergies  Social History     Socioeconomic History   • Marital status: Single     Spouse name: None   • Number of children: None   • Years of education: None   • Highest education level: None   Occupational History   • None   Tobacco Use   • Smoking status: Former     Types: Cigarettes     Quit date: 2019     Years since quittin 1   • Smokeless tobacco: Current   Vaping Use   • Vaping Use: Never used   Substance and Sexual Activity   • Alcohol use: Yes     Comment: rare   • Drug use: Never   • Sexual activity: Yes     Partners: Male     Birth control/protection: None   Other Topics Concern   • None   Social History Narrative   • None     Social Determinants of Health     Financial Resource Strain: Not on file   Food Insecurity: Not on file   Transportation Needs: Not on file   Physical Activity: Not on file   Stress: Not on file   Social Connections: Not on file   Intimate Partner Violence: Not on file   Housing Stability: Not on file     Family History   Problem Relation Age of Onset   • Diabetes Paternal Grandmother    • No Known Problems Mother         no preeclampsia   • No Known Problems Father    • Breast cancer Neg Hx    • Colon cancer Neg Hx    • Ovarian cancer Neg Hx    • Uterine cancer Neg Hx        Review of Systems    Objective   Physical Exam  OBGyn Exam     Objective      /64 (BP Location: Right arm, Patient Position: Sitting)   Ht 5' 5" (1 651 m)   Wt 79 9 kg (176 lb 3 2 oz)   LMP 03/07/2023   BMI 29 32 kg/m²     General:   alert and oriented, in no acute distress   Neck:    Breast:    Heart:    Lungs:    Abdomen: soft, non-tender, without masses or organomegaly   Vulva: normal   Vagina: Without erythema or lesions or discharge  Normal   Cervix: Without lesions or discharge or cervicitis    No Cervical motion tenderness   Uterus: top normal size, anteverted, non-tender   Adnexa: no mass, fullness, tenderness   Rectum: deferred    Psych:  Normal mood and affect   Skin:  Without obvious lesions   Eyes: symmetric, with normal movements and reactivity   Musculoskeletal:  Normal muscle tone and movements appreciated

## 2023-05-04 ENCOUNTER — ULTRASOUND (OUTPATIENT)
Dept: OBGYN CLINIC | Facility: MEDICAL CENTER | Age: 28
End: 2023-05-04

## 2023-05-04 VITALS
SYSTOLIC BLOOD PRESSURE: 110 MMHG | HEIGHT: 65 IN | WEIGHT: 188.4 LBS | BODY MASS INDEX: 31.39 KG/M2 | DIASTOLIC BLOOD PRESSURE: 60 MMHG

## 2023-05-04 DIAGNOSIS — N91.2 AMENORRHEA: Primary | ICD-10-CM

## 2023-05-04 NOTE — PROGRESS NOTES
"Pregnancy Confirmation Visit  [facility] [address] [place] [address]    Assessment/Plan:  24 y o  [id number] presenting with missed menses  Patient's last menstrual period was 03/07/2023 (exact date)  EDC - 12/10/23   @ 8w4d   Sonogram EDC  12/17/23      Final EDC 12/17/23    by   Sonogram         - Continue/start prenatal vitamin  - We reviewed her current medications and discussed which are safe to continue in pregnancy  - We briefly discussed options for aneuploidy screening, to be discussed further at the prenatal intake  - Schedule prenatal intake with RN and initial prenatal visit; prenatal labs will be ordered during the prenatal intake      Subjective:    CC: Missed period    Maday Pisano is a 24 y o  Pradeep Andes who presents with missed menses  Patient's last menstrual period was 03/07/2023 (exact date)  Patient notes that this pregnancy was planned and desired  She was not using contraception at the time of conception  She reports she is certain of her LMP and that she has regular menses  She has has no vaginal bleeding since her LMP      Objective:  /60   Ht 5' 5\" (1 651 m)   Wt 85 5 kg (188 lb 6 4 oz)   LMP 03/07/2023 (Exact Date)   BMI 31 35 kg/m²     Physical Exam:  General: Well appearing, no distress  CV: Regular rate  Respiratory: Unlabored breathing  Abdomen: Soft, nontender  Extremities: Without edema  Mood and Affect: Appropriate    Transvaginal Pelvic Ultrasound  Judge IUP  Yolk sac: Present  Fetal Pole: Present  CRL consistent with EGA 12/17/23  Cardiac activity: Present   bpm  No adnexal masses appreciated                "

## 2023-05-19 ENCOUNTER — INITIAL PRENATAL (OUTPATIENT)
Dept: OBGYN CLINIC | Facility: MEDICAL CENTER | Age: 28
End: 2023-05-19

## 2023-05-19 VITALS
HEIGHT: 65 IN | DIASTOLIC BLOOD PRESSURE: 76 MMHG | BODY MASS INDEX: 30.82 KG/M2 | SYSTOLIC BLOOD PRESSURE: 118 MMHG | WEIGHT: 185 LBS

## 2023-05-19 DIAGNOSIS — Z34.81 PRENATAL CARE, SUBSEQUENT PREGNANCY, FIRST TRIMESTER: Primary | ICD-10-CM

## 2023-05-19 NOTE — PROGRESS NOTES
OB History    Para Term  AB Living   2 1 1 0 0 1   SAB IAB Ectopic Multiple Live Births   0 0 0 0 1      # Outcome Date GA Lbr Alexi/2nd Weight Sex Delivery Anes PTL Lv   2 Current            1 Term 10/11/20 39w4d / 03:12 3420 g (7 lb 8 6 oz) M Vag-Spont EPI  EMMETT         * Pt presents for OB intake  *  *Pt's LMP was Patient's last menstrual period was 2023 (exact date)  *Ultrasound date:2023   7weeks 4days  *Estimated date of delivery: 2023   * confirmed by us    *Signs/Symptoms of Pregnancy   *no Constipation    *no Headaches   *no Cramping  *yes-  Spotting- was in hosp      Diabetes     *no Hx of GDM    *no BMI >35    *no First degree relative with type 2 diabetes    *yes Hx of PCOS     Hypertension-    *no Hx of chronic HTN    *no Hx of gestational HTN   *no hx of preeclampsia, eclampsia, or HELLP syndrome     *Infection Screening   *no Does the pt have a hx of MRSA? *no History of herpes? *Immunizations:   *yes Discussed influenza vaccine   *yes COVID Vaccine *declined      [facility]  Depression *n   Anxiety*n  Medications*n    *Interview education   *Handouts given:    *Baby and Me support center     *[facility] sign up instructions    *Lab Locations    *[facility]ians List/Choosing Pediatrician Sheet    *[facility] Childbirth and Parenting Classes    *Schedule for Prenatal Visits    *Pregnancy Warning Signs Reviewed    *Safe Medications During Pregnancy    *SMA and CF Testing information sheet    *CPT Code Sheet/MFM 13week NT pamphlet    *[employer]      SBIRT Screen:  Depression Screening Follow-up Plan: Patient's depression screening was negative with an [place] score of  5        *[facility]   *yes discussed genetic testing- pt interested   Patient has been informed of basic prenatal advice such as avoiding alcohol, drugs, and smoking   She should remain hydrated and take daily prenatal vitamins  Patient should avoid caffeine, raw sprouts, high mercury fish, undercooked fish, raw eggs, organ meat, unwashed produce, and unpasteurized cheeses, milk, and fruit juice and undercooked meats  She has been informed about toxoplasmosis and to avoid cat feces  *Details that I feel the provider should be aware of:  Lexi Harris was seen for her ob intake at 9w5d  Prenatal panel ordered previously, early gtt ordered today  MFM referral placed  Pt did go to hospital yesterday 5/18/23 with some spotting  Slight spotting with wiping  Pt aware to call mfm to schedule apts  PN1 visit scheduled for *6/9/2023  The patient was oriented to our practice and all questions were answered      Interviewed by:  Katrina Hardy

## 2023-05-30 ENCOUNTER — APPOINTMENT (OUTPATIENT)
Dept: LAB | Facility: MEDICAL CENTER | Age: 28
End: 2023-05-30

## 2023-05-30 DIAGNOSIS — Z34.81 PRENATAL CARE, SUBSEQUENT PREGNANCY, FIRST TRIMESTER: ICD-10-CM

## 2023-05-30 DIAGNOSIS — N91.2 AMENORRHEA: ICD-10-CM

## 2023-05-30 LAB
ABO GROUP BLD: NORMAL
BACTERIA UR QL AUTO: ABNORMAL /HPF
BASOPHILS # BLD AUTO: 0.03 THOUSANDS/ÂΜL (ref 0–0.1)
BASOPHILS NFR BLD AUTO: 0 % (ref 0–1)
BILIRUB UR QL STRIP: NEGATIVE
BLD GP AB SCN SERPL QL: NEGATIVE
CLARITY UR: CLEAR
COLOR UR: ABNORMAL
EOSINOPHIL # BLD AUTO: 0.08 THOUSAND/ÂΜL (ref 0–0.61)
EOSINOPHIL NFR BLD AUTO: 1 % (ref 0–6)
ERYTHROCYTE [DISTWIDTH] IN BLOOD BY AUTOMATED COUNT: 13 % (ref 11.6–15.1)
GLUCOSE UR STRIP-MCNC: NEGATIVE MG/DL
HBV SURFACE AB SER-ACNC: <3 MIU/ML
HBV SURFACE AG SER QL: NORMAL
HCT VFR BLD AUTO: 36.9 % (ref 34.8–46.1)
HGB BLD-MCNC: 12.1 G/DL (ref 11.5–15.4)
HGB UR QL STRIP.AUTO: ABNORMAL
HIV 1+2 AB+HIV1 P24 AG SERPL QL IA: NORMAL
HIV 2 AB SERPL QL IA: NORMAL
HIV1 AB SERPL QL IA: NORMAL
HIV1 P24 AG SERPL QL IA: NORMAL
IMM GRANULOCYTES # BLD AUTO: 0.04 THOUSAND/UL (ref 0–0.2)
IMM GRANULOCYTES NFR BLD AUTO: 0 % (ref 0–2)
KETONES UR STRIP-MCNC: NEGATIVE MG/DL
LEUKOCYTE ESTERASE UR QL STRIP: ABNORMAL
LYMPHOCYTES # BLD AUTO: 2.05 THOUSANDS/ÂΜL (ref 0.6–4.47)
LYMPHOCYTES NFR BLD AUTO: 20 % (ref 14–44)
MCH RBC QN AUTO: 30.7 PG (ref 26.8–34.3)
MCHC RBC AUTO-ENTMCNC: 32.8 G/DL (ref 31.4–37.4)
MCV RBC AUTO: 94 FL (ref 82–98)
MONOCYTES # BLD AUTO: 0.78 THOUSAND/ÂΜL (ref 0.17–1.22)
MONOCYTES NFR BLD AUTO: 8 % (ref 4–12)
MUCOUS THREADS UR QL AUTO: ABNORMAL
NEUTROPHILS # BLD AUTO: 7.07 THOUSANDS/ÂΜL (ref 1.85–7.62)
NEUTS SEG NFR BLD AUTO: 71 % (ref 43–75)
NITRITE UR QL STRIP: NEGATIVE
NON-SQ EPI CELLS URNS QL MICRO: ABNORMAL /HPF
NRBC BLD AUTO-RTO: 0 /100 WBCS
PH UR STRIP.AUTO: 6.5 [PH]
PLATELET # BLD AUTO: 206 THOUSANDS/UL (ref 149–390)
PMV BLD AUTO: 11.2 FL (ref 8.9–12.7)
PROT UR STRIP-MCNC: NEGATIVE MG/DL
RBC # BLD AUTO: 3.94 MILLION/UL (ref 3.81–5.12)
RBC #/AREA URNS AUTO: ABNORMAL /HPF
RH BLD: POSITIVE
RUBV IGG SERPL IA-ACNC: 14.4 IU/ML
SP GR UR STRIP.AUTO: 1.02 (ref 1–1.03)
SPECIMEN EXPIRATION DATE: NORMAL
TREPONEMA PALLIDUM IGG+IGM AB [PRESENCE] IN SERUM OR PLASMA BY IMMUNOASSAY: NORMAL
UROBILINOGEN UR STRIP-ACNC: <2 MG/DL
WBC # BLD AUTO: 10.05 THOUSAND/UL (ref 4.31–10.16)
WBC #/AREA URNS AUTO: ABNORMAL /HPF

## 2023-06-01 LAB — BACTERIA UR CULT: NORMAL

## 2023-06-02 ENCOUNTER — APPOINTMENT (OUTPATIENT)
Dept: LAB | Facility: MEDICAL CENTER | Age: 28
End: 2023-06-02
Payer: COMMERCIAL

## 2023-06-02 LAB — GLUCOSE 1H P 50 G GLC PO SERPL-MCNC: 102 MG/DL (ref 40–134)

## 2023-06-02 PROCEDURE — 82950 GLUCOSE TEST: CPT

## 2023-06-07 NOTE — PROGRESS NOTES
Prenatal H&P     Denies loss of fluid, vaginal discharge, vaginal bleeding  and abdominal pain  Not feeling fetal movement  Tolerating PNV well  Prenatal panel and early 1 hour GTT reviewed - WNL except rubella equivocal and hepatitis B nonimmune  Plans for genetic screening appointment 23  Planned pregnancy  OB history:     G1- 10/11/20 term  male 7lbs 8 6 oz; denies complications      G2- current     Dating:     LMP - 3/7/23 SHAHID 23     US on 23  @  7w 4d SHAHID 23  Working SHAHID 23 by Dr Kelly Hopper      Surgical history:     [place] teeth removal and ear tube removal     Medical History:     BMI 30 and  PCOS    Social history:     Alcohol/ tobacco/ illicit drug rare prior to pregnancy/ denies tobacco or drug use     History of gonorrhea  Denies history any other STD/STIs     Work/ housing/ support [employer]/ apartment- stable/ FOB, family & friends supportive      PCP/ Dental last PE 5 years ago/ last cleaning 1 year      SBIRT screen negative at intake      She denies a hx of recent cough, chills, fever,  STD/STI, denies a personal history  of TB or Zika virus or close contacts with persons with TB or COVID -19  She denies a family history of inheritable conditions such as physical or intellectual disabilities, birth defects, blood disorders, heart or neural tube defects  She has never had MRSA  She denies recent travel or travel planned in the near future  Patient Plans   - Feeding breast & formula  - Contraception uncertain  - Aware office delivers at [facility]  If < 32 weeks will recommend evaluation at [address] [place] [address] St:  - Continue prenatal vitamin daily  - Genetic screening/ [facility] appointment 23  -  Weight gain in pregnancy- Who BMI recommend 11-20 lb    Healthy diet and daily exercise reviewed and encouraged  -Rubella equivocal/hepatitis B nonimmune reviewed MMR vaccine postpartum and okay to get hepatitis B vaccine anytime  - Unit regimen reviewed visit every 4 weeks until 28 weeks, every 2 weeks until 36 weeks and then weekly until delivery  - pap smear UTD  Gonorrhea/ chlamydia collected   - Vaccines in Pregnancy      - TDAP vaccine after 27 gestational weeks     - Reviewed with patient  Pregnancy with COVID infection is considered  high risk and can have poor outcome including  delivery, more severe infection  therefore  pregnant patients are recommended to get  COVID-19 vaccination  Written information provided     - influenza October- March  NA   -  Common discomforts of pregnancy and precautions reviewed  Signs and symptoms to report reviewed  Encouraged social distancing, good hand hygiene, masking, avoiding crowds and written information provided about COVID-19    RTO 4 weeks

## 2023-06-09 ENCOUNTER — INITIAL PRENATAL (OUTPATIENT)
Dept: OBGYN CLINIC | Facility: MEDICAL CENTER | Age: 28
End: 2023-06-09

## 2023-06-09 VITALS — BODY MASS INDEX: 32.3 KG/M2 | DIASTOLIC BLOOD PRESSURE: 72 MMHG | SYSTOLIC BLOOD PRESSURE: 110 MMHG | WEIGHT: 194.1 LBS

## 2023-06-09 DIAGNOSIS — Z3A.12 12 WEEKS GESTATION OF PREGNANCY: ICD-10-CM

## 2023-06-09 DIAGNOSIS — Z11.3 ROUTINE SCREENING FOR STI (SEXUALLY TRANSMITTED INFECTION): ICD-10-CM

## 2023-06-09 DIAGNOSIS — O99.210 OBESITY IN PREGNANCY: Primary | ICD-10-CM

## 2023-06-09 PROBLEM — Z78.9 NONIMMUNE TO HEPATITIS B VIRUS: Status: ACTIVE | Noted: 2023-06-09

## 2023-06-09 PROBLEM — O09.899 RUBELLA NON-IMMUNE STATUS, ANTEPARTUM: Status: ACTIVE | Noted: 2023-06-09

## 2023-06-09 PROBLEM — Z28.39 RUBELLA NON-IMMUNE STATUS, ANTEPARTUM: Status: ACTIVE | Noted: 2023-06-09

## 2023-06-09 PROCEDURE — 87491 CHLMYD TRACH DNA AMP PROBE: CPT | Performed by: NURSE PRACTITIONER

## 2023-06-09 PROCEDURE — 87591 N.GONORRHOEAE DNA AMP PROB: CPT | Performed by: NURSE PRACTITIONER

## 2023-06-09 PROCEDURE — PNV: Performed by: NURSE PRACTITIONER

## 2023-06-12 ENCOUNTER — ROUTINE PRENATAL (OUTPATIENT)
Dept: PERINATAL CARE | Facility: OTHER | Age: 28
End: 2023-06-12
Payer: COMMERCIAL

## 2023-06-12 VITALS
WEIGHT: 194.8 LBS | SYSTOLIC BLOOD PRESSURE: 100 MMHG | BODY MASS INDEX: 32.46 KG/M2 | HEART RATE: 90 BPM | HEIGHT: 65 IN | DIASTOLIC BLOOD PRESSURE: 62 MMHG

## 2023-06-12 DIAGNOSIS — Z3A.13 13 WEEKS GESTATION OF PREGNANCY: ICD-10-CM

## 2023-06-12 DIAGNOSIS — D25.9 UTERINE FIBROID IN PREGNANCY: ICD-10-CM

## 2023-06-12 DIAGNOSIS — O99.210 OBESITY IN PREGNANCY: Primary | ICD-10-CM

## 2023-06-12 DIAGNOSIS — O34.10 UTERINE FIBROID IN PREGNANCY: ICD-10-CM

## 2023-06-12 DIAGNOSIS — O46.8X1 SUBCHORIONIC HEMATOMA IN FIRST TRIMESTER, SINGLE OR UNSPECIFIED FETUS: ICD-10-CM

## 2023-06-12 DIAGNOSIS — O41.8X10 SUBCHORIONIC HEMATOMA IN FIRST TRIMESTER, SINGLE OR UNSPECIFIED FETUS: ICD-10-CM

## 2023-06-12 DIAGNOSIS — Z36.82 ENCOUNTER FOR (NT) NUCHAL TRANSLUCENCY SCAN: ICD-10-CM

## 2023-06-12 LAB
C TRACH DNA SPEC QL NAA+PROBE: NEGATIVE
N GONORRHOEA DNA SPEC QL NAA+PROBE: NEGATIVE

## 2023-06-12 PROCEDURE — 76801 OB US < 14 WKS SINGLE FETUS: CPT | Performed by: STUDENT IN AN ORGANIZED HEALTH CARE EDUCATION/TRAINING PROGRAM

## 2023-06-12 PROCEDURE — 36415 COLL VENOUS BLD VENIPUNCTURE: CPT | Performed by: STUDENT IN AN ORGANIZED HEALTH CARE EDUCATION/TRAINING PROGRAM

## 2023-06-12 PROCEDURE — 76813 OB US NUCHAL MEAS 1 GEST: CPT | Performed by: STUDENT IN AN ORGANIZED HEALTH CARE EDUCATION/TRAINING PROGRAM

## 2023-06-12 PROCEDURE — 99244 OFF/OP CNSLTJ NEW/EST MOD 40: CPT | Performed by: STUDENT IN AN ORGANIZED HEALTH CARE EDUCATION/TRAINING PROGRAM

## 2023-06-12 NOTE — LETTER
"June 12, 2023     Rosa Fallon MD  [address]    Patient: Billy Cain   YOB: 1995   Date of Visit: 6/12/2023       Dear Dr Maritza Singh: Thank you for referring Lynda Cevallos to me for evaluation  Below are my notes for this consultation  If you have questions, please do not hesitate to call me  I look forward to following your patient along with you  Sincerely,        Diogo Helms MD        CC: No Recipients    Diogo Helms MD  6/12/2023  9:19 AM  Sign when Signing Visit  [address]: Ms Holly Angelucci was seen today for nuchal translucency ultrasound  See ultrasound report under \"OB Procedures\" tab  Physical Exam  Constitutional:       General: She is not in acute distress  Appearance: Normal appearance  HENT:      Head: Normocephalic and atraumatic  Eyes:      Extraocular Movements: Extraocular movements intact  Cardiovascular:      Rate and Rhythm: Normal rate  Pulmonary:      Effort: Pulmonary effort is normal  No respiratory distress  Skin:     Findings: No erythema or rash  Neurological:      Mental Status: She is alert and oriented to person, place, and time  Psychiatric:         Mood and Affect: Mood normal          Behavior: Behavior normal          Please don't hesitate to contact our office with any concerns or questions    -Diogo Helms MD           "

## 2023-06-12 NOTE — PROGRESS NOTES
"Patient chose to have [facility] Non-invasive Prenatal Screen with fetal sex  Patient given brochure and is aware [facility] will contact their insurance and coordinate coverage  Patient made aware she will need to respond to text message or e-mail from [facility] within 2 business days or testing will be run through insurance  Patient informed text message will come from area code  \"415\"  Provided [employer] # [phone] and web site : [email or web address]  \"[place] your test online\" card with barcode and test tube ID provided to patient  Reviewed [facility]'s web site states 5-7 business days for results via their portal    UAV Navigation message will be sent to patient when MFM receives results /provider reviews  2 vials of blood drawn from left arm by Román JUAN  Patient tolerated blood draw without difficulty  Specimens labeled with patient identifiers (name, date of birth, specimen collection date), order and specimen were verified with patient, packed and sent via [facility] [facility]  FED EX  tracking #  [id number]  Copy of lab order scanned to Epic media  Maternal Fetal Medicine will have results in approximately 7-10 business days and will call patient or notify via [address]  Patient aware viewing lab result online will reveal fetal sex if ordered  Patient verbalized understanding of all instructions and no questions at this time    "

## 2023-06-12 NOTE — PROGRESS NOTES
"[address]: Ms Cathy Shaw was seen today for nuchal translucency ultrasound  See ultrasound report under \"OB Procedures\" tab  Physical Exam  Constitutional:       General: She is not in acute distress  Appearance: Normal appearance  HENT:      Head: Normocephalic and atraumatic  Eyes:      Extraocular Movements: Extraocular movements intact  Cardiovascular:      Rate and Rhythm: Normal rate  Pulmonary:      Effort: Pulmonary effort is normal  No respiratory distress  Skin:     Findings: No erythema or rash  Neurological:      Mental Status: She is alert and oriented to person, place, and time  Psychiatric:         Mood and Affect: Mood normal          Behavior: Behavior normal          Please don't hesitate to contact our office with any concerns or questions    -Domo Lassiter MD      "

## 2023-06-29 LAB — RUBV IGG SERPL IA-ACNC: 14.4 IU/ML

## 2023-07-07 ENCOUNTER — ROUTINE PRENATAL (OUTPATIENT)
Dept: OBGYN CLINIC | Facility: MEDICAL CENTER | Age: 28
End: 2023-07-07

## 2023-07-07 ENCOUNTER — APPOINTMENT (OUTPATIENT)
Dept: LAB | Facility: MEDICAL CENTER | Age: 28
End: 2023-07-07
Payer: COMMERCIAL

## 2023-07-07 VITALS — WEIGHT: 198.2 LBS | DIASTOLIC BLOOD PRESSURE: 65 MMHG | BODY MASS INDEX: 32.98 KG/M2 | SYSTOLIC BLOOD PRESSURE: 105 MMHG

## 2023-07-07 DIAGNOSIS — O41.8X10 SUBCHORIONIC HEMATOMA IN FIRST TRIMESTER, SINGLE OR UNSPECIFIED FETUS: Primary | ICD-10-CM

## 2023-07-07 DIAGNOSIS — O46.8X1 SUBCHORIONIC HEMATOMA IN FIRST TRIMESTER, SINGLE OR UNSPECIFIED FETUS: Primary | ICD-10-CM

## 2023-07-07 DIAGNOSIS — Z3A.16 16 WEEKS GESTATION OF PREGNANCY: ICD-10-CM

## 2023-07-07 DIAGNOSIS — Z28.39 RUBELLA NON-IMMUNE STATUS, ANTEPARTUM: ICD-10-CM

## 2023-07-07 DIAGNOSIS — O09.899 RUBELLA NON-IMMUNE STATUS, ANTEPARTUM: ICD-10-CM

## 2023-07-07 PROCEDURE — PNV: Performed by: STUDENT IN AN ORGANIZED HEALTH CARE EDUCATION/TRAINING PROGRAM

## 2023-07-07 PROCEDURE — 82105 ALPHA-FETOPROTEIN SERUM: CPT

## 2023-07-07 PROCEDURE — 36415 COLL VENOUS BLD VENIPUNCTURE: CPT

## 2023-07-07 NOTE — PROGRESS NOTES
Routine Prenatal Visit  [facility] [address] [place] [address]    Assessment/Plan:  Johnnie Higgins is a 24y.o. year old  at 16w5d who presents for routine prenatal visit. 1. Subchorionic hematoma in first trimester, single or unspecified fetus    2. Rubella non-immune status, antepartum    3. 16 weeks gestation of pregnancy  -     Alpha fetoprotein, maternal; Future          Subjective:     CC: Prenatal care    Hugh Hernandez is a 24 y.o.  female who presents for routine prenatal care at 16w5d. Pregnancy ROS: Denies leakage of fluid, pelvic pain, or vaginal bleeding. Reports normal fetal movement. The following portions of the patient's history were reviewed and updated as appropriate: allergies, current medications, past family history, past medical history, obstetric history, gynecologic history, past social history, past surgical history and problem list.      Objective:  /65   Wt 89.9 kg (198 lb 3.2 oz)   LMP 2023 (Exact Date)   BMI 32.98 kg/m²   Pregravid Weight/BMI: 85.3 kg (188 lb) (BMI 31.28)  Current Weight: 89.9 kg (198 lb 3.2 oz)   Total Weight Gain: 4.627 kg (10 lb 3.2 oz)   Pre- Vitals    Flowsheet Row Most Recent Value   Prenatal Assessment    Fetal Heart Rate 150   Movement Absent   Prenatal Vitals    Blood Pressure 105/65   Weight - Scale 89.9 kg (198 lb 3.2 oz)   Urine Albumin/Glucose    Dilation/Effacement/Station    Vaginal Drainage    Edema    LLE Edema None   RLE Edema None   Facial Edema None           General: Well appearing, no distress  Respiratory: Unlabored breathing  Cardiovascular: Regular rate. Abdomen: Soft, gravid, nontender  Fundal Height: Appropriate for gestational age. Extremities: Warm and well perfused. Non tender.

## 2023-07-09 PROBLEM — O34.10 UTERINE FIBROID DURING PREGNANCY, ANTEPARTUM: Status: ACTIVE | Noted: 2023-07-09

## 2023-07-09 PROBLEM — U07.1 COVID-19 VIRUS INFECTION: Status: RESOLVED | Noted: 2020-04-21 | Resolved: 2023-07-09

## 2023-07-09 PROBLEM — D25.9 UTERINE FIBROID DURING PREGNANCY, ANTEPARTUM: Status: ACTIVE | Noted: 2023-07-09

## 2023-07-10 ENCOUNTER — ROUTINE PRENATAL (OUTPATIENT)
Dept: PERINATAL CARE | Facility: OTHER | Age: 28
End: 2023-07-10
Payer: COMMERCIAL

## 2023-07-10 VITALS
SYSTOLIC BLOOD PRESSURE: 106 MMHG | BODY MASS INDEX: 32.99 KG/M2 | HEART RATE: 74 BPM | WEIGHT: 198 LBS | HEIGHT: 65 IN | DIASTOLIC BLOOD PRESSURE: 70 MMHG

## 2023-07-10 DIAGNOSIS — O46.8X1 SUBCHORIONIC HEMATOMA IN FIRST TRIMESTER, SINGLE OR UNSPECIFIED FETUS: Primary | ICD-10-CM

## 2023-07-10 DIAGNOSIS — O34.10 UTERINE FIBROID DURING PREGNANCY, ANTEPARTUM: ICD-10-CM

## 2023-07-10 DIAGNOSIS — Z36.3 ENCOUNTER FOR ANTENATAL SCREENING FOR MALFORMATIONS: ICD-10-CM

## 2023-07-10 DIAGNOSIS — D25.9 UTERINE FIBROID DURING PREGNANCY, ANTEPARTUM: ICD-10-CM

## 2023-07-10 DIAGNOSIS — O99.210 OBESITY IN PREGNANCY: ICD-10-CM

## 2023-07-10 DIAGNOSIS — O41.8X10 SUBCHORIONIC HEMATOMA IN FIRST TRIMESTER, SINGLE OR UNSPECIFIED FETUS: Primary | ICD-10-CM

## 2023-07-10 DIAGNOSIS — Z3A.17 17 WEEKS GESTATION OF PREGNANCY: ICD-10-CM

## 2023-07-10 PROCEDURE — 76816 OB US FOLLOW-UP PER FETUS: CPT | Performed by: OBSTETRICS & GYNECOLOGY

## 2023-07-10 PROCEDURE — 99213 OFFICE O/P EST LOW 20 MIN: CPT | Performed by: OBSTETRICS & GYNECOLOGY

## 2023-07-10 NOTE — PATIENT INSTRUCTIONS
Thank you for choosing us for your  care today. If you have any questions about your ultrasound or care, please do not hesitate to contact us or your primary obstetrician. Some general instructions for your pregnancy are:    Exercise: Aim for 22 minutes per day (150 minutes per week) of regular exercise. Walking is great! Nutrition: Choose healthy sources of calcium, iron, and protein. Learn about Preeclampsia: preeclampsia is a common, serious high blood pressure complication in pregnancy. A blood pressure of 616LLTJ (systolic or top number) or 10UAKV (diastolic or bottom number) is not normal and needs evaluation by your doctor. Aspirin is sometimes prescribed in early pregnancy to prevent preeclampsia in women with risk factors - ask your obstetrician if you should be on this medication. For more resources, visit:  [employer]fi  If you smoke, try to reduce how many cigarettes you smoke or try to quit completely. Do not vape. Other warning signs to watch out for in pregnancy or postpartum: chest pain, obstructed breathing or shortness of breath, seizures, thoughts of hurting yourself or your baby, bleeding, a painful or swollen leg, fever, or headache (see AWHONN POST-BIRTH Warning Signs campaign). If these happen call 911. Itching is also not normal in pregnancy and if you experience this, especially over your hands and feet, potentially worse at night, notify your doctors.

## 2023-07-12 LAB
2ND TRIMESTER 4 SCREEN SERPL-IMP: NORMAL
AFP ADJ MOM SERPL: 1.29
AFP INTERP AMN-IMP: NORMAL
AFP INTERP SERPL-IMP: NORMAL
AFP INTERP SERPL-IMP: NORMAL
AFP SERPL-MCNC: 41.6 NG/ML
AGE AT DELIVERY: 26.3 YR
GA METHOD: NORMAL
GA: 16.7 WEEKS
IDDM PATIENT QL: NO
MULTIPLE PREGNANCY: NO
NEURAL TUBE DEFECT RISK FETUS: 4947 %

## 2023-07-13 NOTE — PROGRESS NOTES
[facility]: Ms. Racheal Leon was seen today for fetal growth assessment ultrasound. See ultrasound report under "OB Procedures" tab. The time spent on this established patient on the encounter date included 5 minutes previsit service time reviewing records and precharting, 10 minutes face-to-face service time counseling regarding results and coordinating care, and  5 minutes charting, totalling 20 minutes. Please don't hesitate to contact our office with any concerns or questions.   Kennedy Hernández MD

## 2023-07-28 NOTE — PATIENT INSTRUCTIONS
Thank you for choosing us for your  care today. If you have any questions about your ultrasound or care, please do not hesitate to contact us or your primary obstetrician. Some general instructions for your pregnancy are:    Exercise: Aim for 22 minutes per day (150 minutes per week) of regular exercise. Walking is great! Nutrition: Choose healthy sources of calcium, iron, and protein. Learn about Preeclampsia: preeclampsia is a common, serious high blood pressure complication in pregnancy. A blood pressure of 726YBTL (systolic or top number) or 46CJMF (diastolic or bottom number) is not normal and needs evaluation by your doctor. Aspirin is sometimes prescribed in early pregnancy to prevent preeclampsia in women with risk factors - ask your obstetrician if you should be on this medication. For more resources, visit:  MapCoverage.fi  If you smoke, try to reduce how many cigarettes you smoke or try to quit completely. Do not vape. Other warning signs to watch out for in pregnancy or postpartum: chest pain, obstructed breathing or shortness of breath, seizures, thoughts of hurting yourself or your baby, bleeding, a painful or swollen leg, fever, or headache (see AWHONN POST-BIRTH Warning Signs campaign). If these happen call 911. Itching is also not normal in pregnancy and if you experience this, especially over your hands and feet, potentially worse at night, notify your doctors.

## 2023-07-31 ENCOUNTER — ROUTINE PRENATAL (OUTPATIENT)
Dept: PERINATAL CARE | Facility: CLINIC | Age: 28
End: 2023-07-31
Payer: COMMERCIAL

## 2023-07-31 VITALS
DIASTOLIC BLOOD PRESSURE: 60 MMHG | WEIGHT: 205.4 LBS | SYSTOLIC BLOOD PRESSURE: 100 MMHG | HEART RATE: 84 BPM | HEIGHT: 65 IN | BODY MASS INDEX: 34.22 KG/M2

## 2023-07-31 DIAGNOSIS — D25.9 UTERINE FIBROID DURING PREGNANCY, ANTEPARTUM: ICD-10-CM

## 2023-07-31 DIAGNOSIS — O34.10 UTERINE FIBROID DURING PREGNANCY, ANTEPARTUM: ICD-10-CM

## 2023-07-31 DIAGNOSIS — Z36.86 ENCOUNTER FOR ANTENATAL SCREENING FOR CERVICAL LENGTH: ICD-10-CM

## 2023-07-31 DIAGNOSIS — Z36.3 ENCOUNTER FOR ANTENATAL SCREENING FOR MALFORMATIONS: Primary | ICD-10-CM

## 2023-07-31 DIAGNOSIS — O99.210 OBESITY IN PREGNANCY: ICD-10-CM

## 2023-07-31 PROCEDURE — 76811 OB US DETAILED SNGL FETUS: CPT | Performed by: OBSTETRICS & GYNECOLOGY

## 2023-07-31 PROCEDURE — 76817 TRANSVAGINAL US OBSTETRIC: CPT | Performed by: OBSTETRICS & GYNECOLOGY

## 2023-07-31 PROCEDURE — 99213 OFFICE O/P EST LOW 20 MIN: CPT | Performed by: OBSTETRICS & GYNECOLOGY

## 2023-07-31 NOTE — PROGRESS NOTES
[id number]  [facility] [place]: Ms. Wayne Reilly was seen today for anatomic survey and cervical length screening ultrasound. See ultrasound report under "OB Procedures" tab. The time spent on this established patient on the encounter date included 5 minutes previsit service time reviewing records and precharting, 5 minutes face-to-face service time counseling regarding results and coordinating care, and  4 minutes charting, totalling 14 minutes. Please don't hesitate to contact our office with any concerns or questions.   Elli Martinez MD

## 2023-07-31 NOTE — PROGRESS NOTES
Ultrasound Probe Disinfection    A transvaginal ultrasound was performed. Prior to use, disinfection was performed with High Level Disinfection Process (LYCEEMon). Probe serial number [id number]: [id number] was used.       [place]  07/31/23  10:53 AM

## 2023-08-02 PROBLEM — Z3A.20 20 WEEKS GESTATION OF PREGNANCY: Status: ACTIVE | Noted: 2023-06-09

## 2023-08-02 PROBLEM — N92.6 IRREGULAR BLEEDING: Status: RESOLVED | Noted: 2021-01-22 | Resolved: 2023-08-02

## 2023-08-02 PROBLEM — R11.0 NAUSEA: Status: RESOLVED | Noted: 2020-04-16 | Resolved: 2023-08-02

## 2023-08-04 ENCOUNTER — ROUTINE PRENATAL (OUTPATIENT)
Dept: OBGYN CLINIC | Facility: MEDICAL CENTER | Age: 28
End: 2023-08-04

## 2023-08-04 VITALS
WEIGHT: 201.1 LBS | DIASTOLIC BLOOD PRESSURE: 60 MMHG | HEIGHT: 65 IN | BODY MASS INDEX: 33.51 KG/M2 | SYSTOLIC BLOOD PRESSURE: 105 MMHG

## 2023-08-04 DIAGNOSIS — Z3A.20 20 WEEKS GESTATION OF PREGNANCY: ICD-10-CM

## 2023-08-04 DIAGNOSIS — O34.10 UTERINE FIBROID DURING PREGNANCY, ANTEPARTUM: Primary | ICD-10-CM

## 2023-08-04 DIAGNOSIS — D25.9 UTERINE FIBROID DURING PREGNANCY, ANTEPARTUM: Primary | ICD-10-CM

## 2023-08-04 DIAGNOSIS — O99.210 OBESITY IN PREGNANCY: ICD-10-CM

## 2023-08-04 PROCEDURE — PNV: Performed by: NURSE PRACTITIONER

## 2023-08-04 NOTE — PROGRESS NOTES
Denies loss of fluid, vaginal bleeding and abdominal pain. Confirms frequent fetal movement, flutters. Tolerating prenatal vitamin well. Denies questions or concerns at today's visit   center ultrasound reviewed- breech presentation, anterior placenta, normal appearing fetal growth, SHIRA-WNL and EFW 13 ounces. Ultrasound significant for uterine fibroid. Recommendation is follow-up in 4 to 8 weeks to complete anatomy. BP: 105/60 weight: +13lbs  Plan  -Continue prenatal vitamins daily  -Follow-up with  center scheduled for 23  -Common discomforts of pregnancy and precautions reviewed. Signs and symptoms to report reviewed.   RTO 4 weeks f/u US

## 2023-08-15 ENCOUNTER — TELEPHONE (OUTPATIENT)
Dept: OBGYN CLINIC | Facility: MEDICAL CENTER | Age: 28
End: 2023-08-15

## 2023-08-15 NOTE — TELEPHONE ENCOUNTER
Patient is 22w and is experiencing some burning and discharge. Patient states that she did go to the hospital but the medication that they gave her was not working. Patient would like to know if there is anything can be prescribed. She does have an appointment scheduled for next Tuesday. Please review when you get a chance.  Thank you

## 2023-08-22 ENCOUNTER — ROUTINE PRENATAL (OUTPATIENT)
Dept: OBGYN CLINIC | Facility: MEDICAL CENTER | Age: 28
End: 2023-08-22

## 2023-08-22 VITALS — DIASTOLIC BLOOD PRESSURE: 62 MMHG | BODY MASS INDEX: 35.61 KG/M2 | WEIGHT: 214 LBS | SYSTOLIC BLOOD PRESSURE: 112 MMHG

## 2023-08-22 DIAGNOSIS — Z3A.23 23 WEEKS GESTATION OF PREGNANCY: Primary | ICD-10-CM

## 2023-08-22 DIAGNOSIS — N89.8 VAGINAL DISCHARGE: ICD-10-CM

## 2023-08-22 PROCEDURE — PNV: Performed by: OBSTETRICS & GYNECOLOGY

## 2023-08-22 PROCEDURE — 87510 GARDNER VAG DNA DIR PROBE: CPT | Performed by: OBSTETRICS & GYNECOLOGY

## 2023-08-22 PROCEDURE — 87660 TRICHOMONAS VAGIN DIR PROBE: CPT | Performed by: OBSTETRICS & GYNECOLOGY

## 2023-08-22 PROCEDURE — 87480 CANDIDA DNA DIR PROBE: CPT | Performed by: OBSTETRICS & GYNECOLOGY

## 2023-08-22 NOTE — PROGRESS NOTES
Benjy Childress is a 34y.o. year old  at 23w2d for routine prenatal visit.   + FM, no vaginal bleeding, contractions, or LOF  Complaints: Yes - vaginal dryness at times and occasional burning  - affirm collected    Most recent ultrasound and labs reviewed.   Order for 28 week labs given

## 2023-08-24 LAB
CANDIDA RRNA VAG QL PROBE: NEGATIVE
G VAGINALIS RRNA GENITAL QL PROBE: NEGATIVE
T VAGINALIS RRNA GENITAL QL PROBE: NEGATIVE

## 2023-08-30 ENCOUNTER — ULTRASOUND (OUTPATIENT)
Age: 28
End: 2023-08-30
Payer: COMMERCIAL

## 2023-08-30 VITALS
HEIGHT: 65 IN | HEART RATE: 84 BPM | WEIGHT: 217.2 LBS | DIASTOLIC BLOOD PRESSURE: 64 MMHG | SYSTOLIC BLOOD PRESSURE: 102 MMHG | BODY MASS INDEX: 36.19 KG/M2

## 2023-08-30 DIAGNOSIS — Z36.2 ENCOUNTER FOR FOLLOW-UP ULTRASOUND OF FETAL ANATOMY: ICD-10-CM

## 2023-08-30 DIAGNOSIS — Z36.89 ENCOUNTER FOR ULTRASOUND TO CHECK FETAL GROWTH: ICD-10-CM

## 2023-08-30 DIAGNOSIS — Z3A.24 24 WEEKS GESTATION OF PREGNANCY: Primary | ICD-10-CM

## 2023-08-30 DIAGNOSIS — O99.210 OBESITY IN PREGNANCY: ICD-10-CM

## 2023-08-30 PROCEDURE — 76816 OB US FOLLOW-UP PER FETUS: CPT | Performed by: STUDENT IN AN ORGANIZED HEALTH CARE EDUCATION/TRAINING PROGRAM

## 2023-08-30 NOTE — PROGRESS NOTES
[address] [place] [address] Dr: MsPeyman Martha Favorite was seen today for fetal growth and followup missed anatomy ultrasound. See ultrasound report under "OB Procedures" tab. The time spent on this established patient on the encounter date included 5 minutes previsit service time reviewing records and precharting, 5 minutes face-to-face service time counseling regarding results and coordinating care, and  5 minutes charting, totalling 15 minutes. Please don't hesitate to contact our office with any concerns or questions.   -Nuvia Love MD

## 2023-09-01 ENCOUNTER — ROUTINE PRENATAL (OUTPATIENT)
Dept: OBGYN CLINIC | Facility: MEDICAL CENTER | Age: 28
End: 2023-09-01

## 2023-09-01 VITALS — WEIGHT: 219.2 LBS | BODY MASS INDEX: 36.48 KG/M2 | SYSTOLIC BLOOD PRESSURE: 106 MMHG | DIASTOLIC BLOOD PRESSURE: 70 MMHG

## 2023-09-01 DIAGNOSIS — O09.899 RUBELLA NON-IMMUNE STATUS, ANTEPARTUM: ICD-10-CM

## 2023-09-01 DIAGNOSIS — O34.10 UTERINE FIBROID DURING PREGNANCY, ANTEPARTUM: ICD-10-CM

## 2023-09-01 DIAGNOSIS — O26.02 EXCESSIVE WEIGHT GAIN DURING PREGNANCY IN SECOND TRIMESTER: ICD-10-CM

## 2023-09-01 DIAGNOSIS — D25.9 UTERINE FIBROID DURING PREGNANCY, ANTEPARTUM: ICD-10-CM

## 2023-09-01 DIAGNOSIS — Z78.9 NONIMMUNE TO HEPATITIS B VIRUS: ICD-10-CM

## 2023-09-01 DIAGNOSIS — Z3A.24 24 WEEKS GESTATION OF PREGNANCY: Primary | ICD-10-CM

## 2023-09-01 DIAGNOSIS — Z28.39 RUBELLA NON-IMMUNE STATUS, ANTEPARTUM: ICD-10-CM

## 2023-09-01 PROCEDURE — PNV: Performed by: OBSTETRICS & GYNECOLOGY

## 2023-09-01 NOTE — PROGRESS NOTES
Routine Prenatal Visit  [facility] [address] [place] [address]    Assessment/Plan:  Jair Dee is a 34y.o. year old  at 20w6d who presents for routine prenatal visit. 1. 24 weeks gestation of pregnancy  Assessment & Plan:  NIPT / AFP - normal .  Early GTT normal last scan the baby was in 93% and large AC   Next scan - 10/12/23    Has another GTT at 28 weeks    Orders:  -     Glucose, 1H PG; Future; Expected date: 09/10/2023  -     RPR-Syphilis Screening (Total Syphilis IGG/IGM); Future; Expected date: 09/10/2023  -     Hepatitis C antibody; Future; Expected date: 09/10/2023  -     CBC and differential; Future; Expected date: 09/10/2023  -     Anemia Panel w/Reflex, OB; Future; Expected date: 09/10/2023    2. Nonimmune to hepatitis B virus  Assessment & Plan:  Vaccine post partum       3. Rubella non-immune status, antepartum  Assessment & Plan:  Equivocal - consider vaccine post partum       4. Uterine fibroid during pregnancy, antepartum  Assessment & Plan:  Small 1 cm       5. Excessive weight gain during pregnancy in second trimester  Assessment & Plan:  13.2 kg (29 lb 3.2 oz)            Subjective:     CC: Prenatal care    Taylor Thomas is a 34 y.o.  female who presents for routine prenatal care at 24w5d. Pregnancy ROS: n9o leakage of fluid, pelvic pain, or vaginal bleeding. yes fetal movement.     The following portions of the patient's history were reviewed and updated as appropriate: allergies, current medications, past family history, past medical history, obstetric history, gynecologic history, past social history, past surgical history and problem list.      Objective:  /70   Wt 99.4 kg (219 lb 3.2 oz)   LMP 2023 (Exact Date)   BMI 36.48 kg/m²   Pregravid Weight/BMI: 85.3 kg (188 lb) (BMI 31.28)  Current Weight: 99.4 kg (219 lb 3.2 oz)   Total Weight Gain: 14.2 kg (31 lb 3.2 oz)   Pre-Joe Vitals    Flowsheet Row Most Recent Value   Prenatal Assessment    Fetal Heart Rate 154   Movement Present   Prenatal Vitals    Blood Pressure 106/70   Weight - Scale 99.4 kg (219 lb 3.2 oz)   Urine Albumin/Glucose    Dilation/Effacement/Station    Vaginal Drainage    Draining Fluid No   Edema    LLE Edema None   RLE Edema None   Facial Edema None           General: Well appearing, no distress  Respiratory: Unlabored breathing  Cardiovascular: Regular rate. Abdomen: Soft, gravid, nontender  Fundal Height: Appropriate for gestational age. Extremities: Warm and well perfused. Non tender.

## 2023-09-01 NOTE — ASSESSMENT & PLAN NOTE
NIPT / AFP - normal .  Early GTT normal last scan the baby was in 93% and large AC   Next scan - 10/12/23    Has another GTT at 28 weeks

## 2023-09-23 ENCOUNTER — APPOINTMENT (OUTPATIENT)
Dept: LAB | Facility: IMAGING CENTER | Age: 28
End: 2023-09-23
Payer: COMMERCIAL

## 2023-09-23 DIAGNOSIS — Z3A.24 24 WEEKS GESTATION OF PREGNANCY: ICD-10-CM

## 2023-09-23 LAB
BASOPHILS # BLD AUTO: 0.02 THOUSANDS/ÂΜL (ref 0–0.1)
BASOPHILS NFR BLD AUTO: 0 % (ref 0–1)
EOSINOPHIL # BLD AUTO: 0.06 THOUSAND/ÂΜL (ref 0–0.61)
EOSINOPHIL NFR BLD AUTO: 1 % (ref 0–6)
ERYTHROCYTE [DISTWIDTH] IN BLOOD BY AUTOMATED COUNT: 13.4 % (ref 11.6–15.1)
GLUCOSE 1H P 50 G GLC PO SERPL-MCNC: 193 MG/DL (ref 40–134)
HCT VFR BLD AUTO: 36.8 % (ref 34.8–46.1)
HGB BLD-MCNC: 11.7 G/DL (ref 11.5–15.4)
IMM GRANULOCYTES # BLD AUTO: 0.03 THOUSAND/UL (ref 0–0.2)
IMM GRANULOCYTES NFR BLD AUTO: 0 % (ref 0–2)
LYMPHOCYTES # BLD AUTO: 1.21 THOUSANDS/ÂΜL (ref 0.6–4.47)
LYMPHOCYTES NFR BLD AUTO: 16 % (ref 14–44)
MCH RBC QN AUTO: 29.8 PG (ref 26.8–34.3)
MCHC RBC AUTO-ENTMCNC: 31.8 G/DL (ref 31.4–37.4)
MCV RBC AUTO: 94 FL (ref 82–98)
MONOCYTES # BLD AUTO: 0.32 THOUSAND/ÂΜL (ref 0.17–1.22)
MONOCYTES NFR BLD AUTO: 4 % (ref 4–12)
NEUTROPHILS # BLD AUTO: 5.81 THOUSANDS/ÂΜL (ref 1.85–7.62)
NEUTS SEG NFR BLD AUTO: 79 % (ref 43–75)
NRBC BLD AUTO-RTO: 0 /100 WBCS
PLATELET # BLD AUTO: 159 THOUSANDS/UL (ref 149–390)
PMV BLD AUTO: 11.7 FL (ref 8.9–12.7)
RBC # BLD AUTO: 3.92 MILLION/UL (ref 3.81–5.12)
WBC # BLD AUTO: 7.45 THOUSAND/UL (ref 4.31–10.16)

## 2023-09-23 PROCEDURE — 86780 TREPONEMA PALLIDUM: CPT

## 2023-09-23 PROCEDURE — 86803 HEPATITIS C AB TEST: CPT

## 2023-09-23 PROCEDURE — 85025 COMPLETE CBC W/AUTO DIFF WBC: CPT

## 2023-09-23 PROCEDURE — 82950 GLUCOSE TEST: CPT

## 2023-09-23 PROCEDURE — 36415 COLL VENOUS BLD VENIPUNCTURE: CPT

## 2023-09-24 LAB
HCV AB SER QL: NORMAL
TREPONEMA PALLIDUM IGG+IGM AB [PRESENCE] IN SERUM OR PLASMA BY IMMUNOASSAY: NORMAL

## 2023-09-25 ENCOUNTER — TELEPHONE (OUTPATIENT)
Dept: OBGYN CLINIC | Facility: MEDICAL CENTER | Age: 28
End: 2023-09-25

## 2023-09-25 DIAGNOSIS — O24.419 GESTATIONAL DIABETES MELLITUS (GDM) IN SECOND TRIMESTER, GESTATIONAL DIABETES METHOD OF CONTROL UNSPECIFIED: Primary | ICD-10-CM

## 2023-09-25 NOTE — TELEPHONE ENCOUNTER
Contacted patient and left message. Advised referral was placed for Maternal Fetal Medicine for Diabetic education. Provided office number for her to contact them.

## 2023-09-26 ENCOUNTER — TELEPHONE (OUTPATIENT)
Dept: OBGYN CLINIC | Facility: MEDICAL CENTER | Age: 28
End: 2023-09-26

## 2023-09-26 ENCOUNTER — HOSPITAL ENCOUNTER (OUTPATIENT)
Facility: HOSPITAL | Age: 28
Discharge: HOME | End: 2023-09-26
Attending: OBSTETRICS & GYNECOLOGY | Admitting: OBSTETRICS & GYNECOLOGY
Payer: COMMERCIAL

## 2023-09-26 VITALS — DIASTOLIC BLOOD PRESSURE: 65 MMHG | HEART RATE: 78 BPM | SYSTOLIC BLOOD PRESSURE: 115 MMHG

## 2023-09-26 PROBLEM — O36.8190 DECREASED FETAL MOVEMENT: Status: ACTIVE | Noted: 2023-09-26

## 2023-09-26 PROCEDURE — 76815 OB US LIMITED FETUS(S): CPT

## 2023-09-26 PROCEDURE — 76817 TRANSVAGINAL US OBSTETRIC: CPT

## 2023-09-26 PROCEDURE — NC001 PR NO CHARGE: Performed by: OBSTETRICS & GYNECOLOGY

## 2023-09-26 PROCEDURE — 59025 FETAL NON-STRESS TEST: CPT

## 2023-09-26 PROCEDURE — 99213 OFFICE O/P EST LOW 20 MIN: CPT

## 2023-09-26 NOTE — PROCEDURES
Jesús Arguello, a [id number] at 28w2d with an SHAHID of 12/17/2023, by Ultrasound, was seen at [address] for the following procedure(s): $Procedure Type: SHIRA, NST, US - Transvaginal]    Nonstress Test  Reason for NST: Decreased Fetal Movement  Variability: Moderate  Decelerations: None  Accelerations: Yes  Baseline: 130 BPM  Uterine Irritability: No  Contractions: Not present    4 Quadrant SHIRA  SHIRA Q1 (cm): 3.7 cm  SHIRA Q2 (cm): 5.2 cm  SHIRA Q3 (cm): 1.8 cm  SHIRA Q4 (cm): 5.2 cm  SHIRA TOTAL (cm): 15.9 cm         Ultrasound Other  Fetal Presentation: Breech  Cervical Length: 3.57  Funnel: No  Debris: No  Placenta Location: Anterior  Placenta Previa: No  Vasa Previa: No    Interpretation  Nonstress Test Interpretation: Reactive  Overall Impression: Reassuring                Ultrasound Probe Disinfection    A transvaginal ultrasound was performed.    Prior to use, disinfection was performed with High Level Disinfection Process (T[employer]on)    Wilmar You MD  09/26/23  4:23 PM

## 2023-09-26 NOTE — TELEPHONE ENCOUNTER
Pt lmom stating she was feeling a bit dizzy and had some tightness in abd area.  Would like a call back to discuss

## 2023-09-26 NOTE — PROGRESS NOTES
L&D Triage Note - OB/GYN  Sugar Rai 34 y.o. female MRN: [id number]  Unit/Bed#: L&D 329-02 Encounter: [id number]    [facility]    ASSESSMENT  Sugar Rai is a 34 y.o. Macey Bame at 28w2d being evaluated for DFM and rule out  labor. PLAN  #1. Rule out  labor:   · Speculum exam: No pooling appreciated, cervix fully visualized and does not appear dilated  · Microscopy: Negative  · Cervical length: 3.57cm  · SVE: 0/0/-3    #2. Decreased fetal movement:   · NST reactive   · SHIRA: 15.85 cm  · Fetus seen moving on ultrasound  · Patient reports feeling movement now    Discharge instructions  · Patient instructed to call if experiencing worsening contractions, vaginal bleeding, loss of fluid or decreased fetal movement. · Will follow up with OBGYN on . D/w Dr. Anum Burton  ______________    SUBJECTIVE    SHAHID: Estimated Date of Delivery: 23    HPI:  34 y.o.  28w2d presents with complaint of decreased fetal movement and cramping. She felt baby move once today, and is usually very active. She is feeling baby move now. Failed 1hrGTT on . She also reports cramping for the past 2 days. No dysuria, hematuria, increase or change in vaginal discharge. Of note, she reports she has intermittently felt light headed the past few days. She is not currently light headed. Denies chest pain or SOB. Contractions: cramping  Leakage of fluid: denies  Vaginal Bleeding: denies  Fetal movement: present, but was previously decreased from baseline    Her obstetrical history is significant for subchorionic hematoma in first trimester, elevated 1 hr GTT      ROS:  Constitutional: Intermittently light headed  Respiratory: Negative  Cardiovascular: Negative    Gastrointestinal: Negative    OBJECTIVE:  /65   Pulse 78   LMP 2023 (Exact Date)   There is no height or weight on file to calculate BMI. Physical Exam  Vitals reviewed.  Exam conducted with a chaperone present. Constitutional:       General: She is not in acute distress. HENT:      Head: Normocephalic. Mouth/Throat:      Pharynx: Oropharynx is clear. Eyes:      General: No scleral icterus. Conjunctiva/sclera: Conjunctivae normal.   Cardiovascular:      Rate and Rhythm: Normal rate and regular rhythm. Heart sounds: Normal heart sounds. No murmur heard. Pulmonary:      Effort: Pulmonary effort is normal. No respiratory distress. Breath sounds: Normal breath sounds. Abdominal:      Palpations: Abdomen is soft. Tenderness: There is no abdominal tenderness. Comments: Gravid   Musculoskeletal:         General: No tenderness. Right lower leg: No edema. Left lower leg: No edema. Skin:     General: Skin is warm and dry. Coloration: Skin is not jaundiced. Neurological:      Mental Status: She is alert. Psychiatric:         Mood and Affect: Mood normal.         Behavior: Behavior normal.       Speculum exam:  Normal external female genitalia  Cervix fully visualized and not visibly dilated  Physiologic discharge  No bleeding, pooling, abnormal discharge, or lesions noted    Microscopy:     Infection:   - No clue cells    - No hyphae   - No trichomonads present    Membrane status   - No ferning   - Neg nitrazene   - No pooling     SVE:  0/0/-3    FHT:  Baseline Rate: 130 bpm  Variability: Moderate 6-25 bpm  Accelerations: 15 x 15 or greater  No decelerations    TOCO:   Contraction Frequency (minutes): 0  Contraction Duration (seconds): 0    IMAGING:       TVUS   Cervical length         - 3.57 cm         - 3.42 cm         - 3.08 cm   Presentation: Breech        TAUS   SHIRA      - Q1 3.71 cm     - Q2 5.16 cm     - Q3 1.78 cm     - Q4 5.20 cm     - Total: 15.85 cm   Placenta: Anterior   Presentation: Breech                    Labs: No results found for this or any previous visit (from the past 24 hour(s)).       Tia Luke MD  9/26/2023  12:43 PM

## 2023-09-26 NOTE — TELEPHONE ENCOUNTER
Patient returned call. Patient reports significant decrease in fetal movements. Patient states she has only felt baby move once today and he is usually very active. Patient c/o dizziness and feeling lightheaded. Discussed concerns with call provider and patient to go to L&D for evaluation. Patient agreeable with plan and will be heading over shortly. On-call provider and charge nurse made aware.

## 2023-09-28 ENCOUNTER — PATIENT MESSAGE (OUTPATIENT)
Dept: PERINATAL CARE | Facility: CLINIC | Age: 28
End: 2023-09-28

## 2023-09-28 ENCOUNTER — TELEMEDICINE (OUTPATIENT)
Dept: PERINATAL CARE | Facility: CLINIC | Age: 28
End: 2023-09-28
Payer: COMMERCIAL

## 2023-09-28 DIAGNOSIS — O24.410 DIET CONTROLLED GESTATIONAL DIABETES MELLITUS (GDM) IN THIRD TRIMESTER: Primary | ICD-10-CM

## 2023-09-28 DIAGNOSIS — O24.410 DIET CONTROLLED GESTATIONAL DIABETES MELLITUS (GDM) IN SECOND TRIMESTER: Primary | ICD-10-CM

## 2023-09-28 DIAGNOSIS — O26.03 EXCESSIVE WEIGHT GAIN DURING PREGNANCY IN THIRD TRIMESTER: ICD-10-CM

## 2023-09-28 DIAGNOSIS — Z3A.28 28 WEEKS GESTATION OF PREGNANCY: ICD-10-CM

## 2023-09-28 DIAGNOSIS — O99.213 OBESITY COMPLICATING PREGNANCY, THIRD TRIMESTER: ICD-10-CM

## 2023-09-28 PROCEDURE — G0109 DIAB MANAGE TRN IND/GROUP: HCPCS | Performed by: DIETITIAN, REGISTERED

## 2023-09-28 RX ORDER — BLOOD-GLUCOSE METER
EACH MISCELLANEOUS
Qty: 1 KIT | Refills: 0 | Status: SHIPPED | OUTPATIENT
Start: 2023-09-28 | End: 2023-12-17

## 2023-09-28 RX ORDER — BLOOD SUGAR DIAGNOSTIC
STRIP MISCELLANEOUS
Qty: 100 STRIP | Refills: 4 | Status: SHIPPED | OUTPATIENT
Start: 2023-09-28 | End: 2023-12-17

## 2023-09-28 RX ORDER — LANCETS 33 GAUGE
EACH MISCELLANEOUS
Qty: 100 EACH | Refills: 4 | Status: SHIPPED | OUTPATIENT
Start: 2023-09-28 | End: 2023-12-17

## 2023-09-28 NOTE — PROGRESS NOTES
Virtual Regular Visit    Verification of patient location:  [place], [place]    Patient is located at Home in the following state in which I hold an active license [place]      Assessment/Plan:    Problem List Items Addressed This Visit    None  Visit Diagnoses     Gestational diabetes mellitus (GDM) in second trimester, gestational diabetes method of control unspecified                   Reason for visit is   Chief Complaint   Patient presents with   • Virtual Regular Visit        Encounter provider Jeff Marquez    Provider located at [address] [address] [phone]      Recent Visits  No visits were found meeting these conditions. Showing recent visits within past 7 days and meeting all other requirements  Today's Visits  Date Type Provider Dept   09/28/23 [facility]   Showing today's visits and meeting all other requirements  Future Appointments  No visits were found meeting these conditions. Showing future appointments within next 150 days and meeting all other requirements       The patient was identified by name and date of birth. Alva Mendez was informed that this is a telemedicine visit and that the visit is being conducted through the [address] [place] [address] [employer] platform. She agrees to proceed. .  My office door was closed. No one else was in the room. She acknowledged consent and understanding of privacy and security of the video platform. The patient has agreed to participate and understands they can discontinue the visit at any time. Patient is aware this is a billable service. Subjective  Alva Mendez is a 34 y.o. female pregnant patient.       HPI     Past Medical History:   Diagnosis Date   • Coronavirus infection     tested positive 4/20/20, never had fever, cleared via telemedicine   • COVID-19 virus infection 4/21/2020   • History of PCOS     getting periods regularly now   • Obesity, Class II, BMI 35-39.9    • Polycystic ovary syndrome    • Varicella vaccine        Past Surgical History:   Procedure Laterality Date   • EAR TUBE REMOVAL     • WISDOM TOOTH EXTRACTION         Current Outpatient Medications   Medication Sig Dispense Refill   • Prenatal Vit-Fe Fum-FA-Omega (Prenatal Multi +DHA) 27-0.8-228 MG CAPS Take 1 capsule by mouth daily 90 capsule 3     No current facility-administered medications for this visit. No Known Allergies    Review of Systems    Video Exam    There were no vitals filed for this visit. Physical Exam --not performed. Visit Time  Total Visit Duration: 60 minutes        Thank you for referring your patient to [facility] Maternal Fetal Medicine Diabetes in Pregnancy Program.     Lisy Zamarripa is a  34 y.o. female who presents today for Group Class 1. Patient is at 35w3d gestation, Estimated Date of Delivery: 23. Reviewed and updated the following from patients medical record: PMH, Problem List, Allergies, and Current Medications. Visit Diagnosis:  Diet controlled GDM    Discussed with patient pathophysiology of GDM, untreated hyperglycemia in pregnancy and maternal fetal complications including fetal macrosomia,  hypoglycemia, polyhydramnios, increased incidence of  section,  labor, and in severe cases fetal demise and still birth . Discussed importance of blood glucose monitoring, nutrition, and medication if necessary in achieving BG goals.      Additional Pregnancy Complications:  Obesity, excess weight gain for the pregnancy, PCOS    Labs:    Lab Results   Component Value Date    [id number] 193 (H) 2023       No results found for: "GLUF", "Craige Mourning", "Clemetine Scriver", "Mandie Prophet"     No components found for: "HGA1C"    Medications:  No diabetes related medications    Anthropometrics:  Ht Readings from Last 3 Encounters:   23 5' 5" (1.651 m)   23 5' 5" (1.651 m)   23 5' 5" (1.651 m)     Wt Readings from Last 3 Encounters:   23 99.4 kg (219 lb 3.2 oz)   23 98.5 kg (217 lb 3.2 oz)   23 97.1 kg (214 lb)     Pre-gravid weight: 85.3 kg (188 lb)  Pre-gravid BMI: 31.28  Weight Change: 14.2 kg (31 lb 3.2 oz)  Weight gain recommendations: BMI (> 30) 11-20 lbs  Comments: Patient needs to maintain her weight for the remainder of the pregnancy. Recent Ultra Sound Results:  Date: 23  Fetal Growth: AC-92% & EFW-95%  SHIRA: Normal  Next  date: 10/12/23    Blood Glucose Monitoring:   Glucose Meter: OneTouch Verio Flex  Instructed on testing blood sugars: 4 x per day (Fasting, 2 hour after start of each meal)    Gave instruction on site selection, skin preparation, loading strips and lancet device, meter activation, obtaining blood sample, test strip and lancet disposal and storage, and recording log book entries. Patient has good understanding of material covered and was able to test their own blood sugar in office today. Instruction for reporting blood sugar results weekly via:  Phone: [phone]   OR  My Chart (Message with image attachment, or Glucose Flowsheet)    Goal Blood Sugar Ranges:   Fastin-90 mg/dL  1 hour after the start of each meal: 140 mg/dL or less  2 hours after start of each meal: 120 mg/dL or less    Meal Plan (daily calorie and protein needs):  Calories: 2200 calorie (CHO:98-71-17-30-60-30) (PRO: 3-2-3/4-2-3/4-2)    Type of Diet:Regular  Additional Nutrition Concerns: None    Meal Plan Tips:  1. Patient was provided with a meal plan including 3 meals and 3 snacks. 2. Discussed appropriate amounts of CHO, PRO, and Fat at each meal and snack. 3. Reviewed CHO exchange list, and portion sizes for both CHO and PRO via food models  4. Instruction on how to read a food label  5. Provided suggested meal/snack options to increase nutrition and maintain consistent meal and snack intakes.   6. Instructed on how to keep a 3-day food diary to be brought to follow- up appointment. 7. Encouraged  patient to eat every 2.0-3.5 hours while awake  8. Encouraged patient to go no longer than 8-10 hours fasting overnight until first meal of the day. Physical Activity:  Discussed benefits of physical activity to optimize blood glucose control, encouraged activity at patient is physically able. Always consult a physician prior to starting an exercise program. Recommend 20-30 minutes daily. Patient Stated Goal: "I will walk 20-30 minutes after dinner daily"    Diabetes Self Management Support Plan outside of ongoing care: Spouse/Family    Learner/s Present:Learners Present: Patient   Barriers to Learning/Change: No Barriers  Expected Compliance: good    Date to report blood sugars: Wednesday, 10/4/23  Class 2 (date): Thursday, 10/5/23    Begin Time: 10:05 AM  End Time: 11:05 AM    It was a pleasure working with them today. Please feel free to call with any questions or concerns.     Myranda Lal, MS, RD, [address] [facility]  Diabetes Educator  [facility] Maternal Fetal Medicine  Diabetes in Pregnancy Program  [id number] [place] [address] 55, 092 [address]

## 2023-09-29 ENCOUNTER — ROUTINE PRENATAL (OUTPATIENT)
Dept: OBGYN CLINIC | Facility: MEDICAL CENTER | Age: 28
End: 2023-09-29
Payer: COMMERCIAL

## 2023-09-29 VITALS
DIASTOLIC BLOOD PRESSURE: 70 MMHG | WEIGHT: 219.7 LBS | BODY MASS INDEX: 36.6 KG/M2 | HEIGHT: 65 IN | SYSTOLIC BLOOD PRESSURE: 110 MMHG

## 2023-09-29 DIAGNOSIS — O99.810 ABNORMAL GLUCOSE TOLERANCE TEST (GTT) DURING PREGNANCY, ANTEPARTUM: ICD-10-CM

## 2023-09-29 DIAGNOSIS — Z3A.28 28 WEEKS GESTATION OF PREGNANCY: ICD-10-CM

## 2023-09-29 DIAGNOSIS — O26.03 EXCESSIVE WEIGHT GAIN DURING PREGNANCY IN THIRD TRIMESTER: Primary | ICD-10-CM

## 2023-09-29 DIAGNOSIS — O99.210 OBESITY IN PREGNANCY: ICD-10-CM

## 2023-09-29 PROCEDURE — 90715 TDAP VACCINE 7 YRS/> IM: CPT | Performed by: NURSE PRACTITIONER

## 2023-09-29 PROCEDURE — PNV: Performed by: NURSE PRACTITIONER

## 2023-09-29 PROCEDURE — 90471 IMMUNIZATION ADMIN: CPT | Performed by: NURSE PRACTITIONER

## 2023-09-29 PROCEDURE — 90686 IIV4 VACC NO PRSV 0.5 ML IM: CPT | Performed by: NURSE PRACTITIONER

## 2023-09-29 PROCEDURE — 90472 IMMUNIZATION ADMIN EACH ADD: CPT | Performed by: NURSE PRACTITIONER

## 2023-09-29 NOTE — PROGRESS NOTES
Denies loss of fluids, vaginal bleeding and abdominal pain. Confirms frequent fetal movement. Tolerating prenatal vitamin well. Reviewed 28-week labs significant for elevated 1 hour glucose. Is awaiting glucometer check blood sugars. Met with diabetes and pregnancy program.  Denies questions or concerns at today's visit. Patient plans include epidural for pain control during labor, circumcision for infant, ParaGard IUD for contraception, breast-feeding- has pump ordered  Plan   -Continue prenatal vitamins daily  -Fetal kick counts reviewed, encouraged daily and written information provided  -Encourage close contact with diabetes and pregnancy program  -Follow-up with  center scheduled for 10/12/23  -Tdap and influenza vaccine administered today  -28-week folder provided and reviewed. Printed breast pump order provided to patient  -Common discomforts of pregnancy and precautions including  labor reviewed. Signs and symptoms to report reviewed.    RTO 2 weeks

## 2023-10-05 ENCOUNTER — TELEMEDICINE (OUTPATIENT)
Dept: PERINATAL CARE | Facility: CLINIC | Age: 28
End: 2023-10-05
Payer: COMMERCIAL

## 2023-10-05 DIAGNOSIS — O24.419 GESTATIONAL DIABETES MELLITUS (GDM) IN THIRD TRIMESTER, GESTATIONAL DIABETES METHOD OF CONTROL UNSPECIFIED: Primary | ICD-10-CM

## 2023-10-05 DIAGNOSIS — Z3A.29 29 WEEKS GESTATION OF PREGNANCY: ICD-10-CM

## 2023-10-05 DIAGNOSIS — O26.03 EXCESSIVE WEIGHT GAIN DURING PREGNANCY IN THIRD TRIMESTER: ICD-10-CM

## 2023-10-05 DIAGNOSIS — O99.210 OBESITY IN PREGNANCY: ICD-10-CM

## 2023-10-05 PROCEDURE — G0108 DIAB MANAGE TRN  PER INDIV: HCPCS

## 2023-10-05 NOTE — PROGRESS NOTES
CLASS 2 - Individual  (virtual visit)    Thank you for referring your patient to [facility] Maternal Fetal Medicine Diabetes and Pregnancy Program.     Lisy Zamarripa is a  34 y.o. female who presents today unaccompanied for Virtual Regular Visit, Patient Education, and Gestational Diabetes. Patient is at 29w4d gestation, Estimated Date of Delivery: 12/17/23. Visit Diagnosis:  Encounter Diagnosis     ICD-10-CM    1. Gestational diabetes mellitus (GDM) in third trimester, gestational diabetes method of control unspecified  O24.419       2. 29 weeks gestation of pregnancy  Z3A.29       3. Obesity in pregnancy  O99.210       4. Excessive weight gain during pregnancy in third trimester  O26.03          Reviewed and updated the following from patients medical record: PMH, Problem List, Allergies, and Current Medications.     Labs  GDM LABS: See Class 1 Note    A1C:  No results found for: "HGBA1C"     Labs Ordered This Visit: None    Current Medications:    Current Outpatient Medications:   •  Blood Glucose Monitoring Suppl (OneTouch Verio Flex System) w/Device KIT, Test 4 times daily, Disp: 1 kit, Rfl: 0  •  Lancets (OneTouch Delica Plus [id number]) MISC, Test 4 times daily, Disp: 100 each, Rfl: 4  •  OneTouch Verio test strip, Test 4 times daily, Disp: 100 strip, Rfl: 4  •  Prenatal Vit-Fe Fum-FA-Omega (Prenatal Multi +DHA) 27-0.8-228 MG CAPS, Take 1 capsule by mouth daily, Disp: 90 capsule, Rfl: 3     Anthropometrics:  Ht Readings from Last 1 Encounters:   09/29/23 5' 5" (1.651 m)      Wt Readings from Last 3 Encounters:   09/29/23 99.7 kg (219 lb 11.2 oz)   09/01/23 99.4 kg (219 lb 3.2 oz)   08/30/23 98.5 kg (217 lb 3.2 oz)        Pre-Gravid Wt Pre-Gravid BMI TWG   85.3 kg (188 lb) 31.28 14.4 kg (31 lb 11.2 oz)     Total Pregnancy Weight Gain Recommendations: BMI (> 30) 11-20 lbs  • Current Wt Status Compared to Recommendations: Exceeding -- Recommended to maintain wt for remainder of pregnancy    Most Recent Ultrasound Results:  • Findings: (23) AC: 92%, EFW: 95%; NML SHIRA  o Further Fetal Surveillance: None  • Next US date: Scheduled Appropriately, 10/12/23    BLOOD GLUCOSE MONITORING:   Glucometer: OneTouch Verio Flex     Reinforced at [employer] Visit:   • Timing/Frequency of SMB x per day (Fasting, 2 hour after start of each meal)  • Goals: (Fasting) 60-95mg/dL // (2hr PP) <120mg/dL  • Reporting Guidelines: Weekly via Phone: [phone] OR My Chart (Message with image attachment) OR Glucose Flowsheet  o Method of Reporting: [employer] Glucose Flowsheet    BG LOG:         Review of Blood Glucose Log:   • FBG = Well controlled  • Post-Prandial BG = Well controlled    MEAL PLAN (Patient was provided with a meal plan including 3 meals and 3 snacks at class 1)  *Calories: 2200 calorie (CHO:83-08-54-30-60-30) (PRO: 3-2-3/4-2-3/4-2)    Review of Patient's Current Diet: refer to class 1 note for additional details    Wakeup: 5:30-6am  Breakfast (7:30-8:30am): Breakfast [place] on English Muffin OR Hard Boiled Egg + Apple OR English Muffin + Peanut Butter + Two hard boiled eggs  AM Snack: Apple/Grapes + Cheese/Almonds   Lunch: [employer] Burger + Broccoli + Two pieces of bread  PM Snack: Apple/Grapes + Cheese/Almonds   Dinner(5-5:30): Not Reviewed  Bedtime Snack: Sometimes skips   Bed: 7:30-8pm    Beverages: No Sugar Sweetened Beverages (Coffee + Zero Sugar Creamer)  Dining Out Frequency: Once a week (Bar) - Wings    Meal Plan Recommendations Compliant? Comments:    Consistent CHO Intake Yes     3 Meals and 3 Snacks No  - Sometimes skipping bedtime snack   Protein w/ Every Meal and Snack Yes     Eating every 2-3.5hrs while awake  Yes     8-10hrs Fasting (from time of bedtime snack until first meal of the day) No  - Exceeding 10hrs Fasting Overnight: Reinforced recommended time frame of (8-10hrs) from time of bedtime snack     Reinforced Diet Instructions:  1. Individualized meal plan.    2. Importance of consistent carbohydrate intake via 3 meals and 3 snacks per day   3. Importance of protein as it relates to blood glucose control. 4. Encouraged  patient to eat every 2.0-3.5 hours while awake  5. Encouraged patient to go no longer than 8-10 hours fasting overnight until first meal of the day. 6. Provided suggested meal/snack options to increase nutrition and maintain consistent meal and snack intakes. Physical Activity:  • Currently physically active? No    Reviewed w/ Pt:   • Benefits of physical activity to optimize blood glucose control, encouraged activity at patient is physically able.   o Instructed pt to always consult a physician prior to starting an exercise program.   • Recommend 20-30 minutes daily. Additional Topics Reviewed:    • Medications: (reviewed options available with pt)  o Discussed if blood sugars are not within normal range with meal planning and exercise  o Reviewed medication such as metformin and/or basal/bolus insulin may be needed for better glucose control  • Maternal-Fetal Testing:   o Ultrasounds: growth scans every 4 weeks. o NST: twice weekly starting at 32nd week GA  o SHIRA:  weekly starting at 32 weeks GA  • Sick day Guidelines:   o Advised that sickness will raise blood sugar   o If blood sugar is > 160 mg/dL twice in one day call doctor  o If on diabetes medications, continue as instructed   o If unable to consume normal meal plan, instructed to remain well hydrated   • Hypoglycemia & Treatment Guidelines:  o Reviewed what hypoglycemia is, signs and symptoms, and how to treat via the 15:15 rule. • Post-Partum Guidelines:  o Completion of 75 gm CHO 2 hr gtt at 6 weeks post-partum to check for Type 2 DM diagnosis  • Breastfeeding Guidelines:  o Continue GDM meal plan plus additional 350-500 calories daily  - Examples of protein and carbohydrate snacks provided. o Stay hydrated by drinking 8-10 (8 oz.) fluids daily.   • Dining Out & Travel Guidelines:  o Patient advised to be prepared with extra diabetes supplies, medications, and snacks, as well as sticking to the same time schedule and portions eaten at home for meals and snacks. Patient Stated Goal: "I will walk 20-30 minutes after dinner daily"  Goal Assessment: Not on track    Diabetes Self Management Support Plan outside of ongoing care: Spouse/Family    Barriers to Learning/Change: No Barriers  Expected Compliance: good    Date to report blood sugars: Weekly   Follow up:  Return per weekly review of blood sugar log. Begin Time: 10:00am  End Time: 11:00am    It was a pleasure working with them today. Please feel free to call [phone]) with any questions or concerns.     Cyn Knox RD   Diabetes Educator  [facility] Maternal Fetal Medicine  Diabetes and Pregnancy Program  [address] [place] [address], [address] [place] [address]      Virtual Regular Visit    Verification of patient location:    Patient is located at Home in the following state in which I hold an active license [place]      Assessment/Plan:    Problem List Items Addressed This Visit     28 weeks gestation of pregnancy    Excessive weight gain during pregnancy in third trimester    Obesity in pregnancy   Other Visit Diagnoses     Gestational diabetes mellitus (GDM) in third trimester, gestational diabetes method of control unspecified    -  Primary               Reason for visit is   Chief Complaint   Patient presents with   • Virtual Regular Visit   • Patient Education   • Gestational Diabetes        Encounter provider Cyn Knox, [address] [facility]    Provider located at [address] [address] [phone]      Recent Visits  Date Type Provider Dept   09/28/23 [facility]   Showing recent visits within past 7 days and meeting all other requirements  Today's Visits  Date Type Provider Dept   10/05/23 [address] [place] [address] today's visits and meeting all other requirements  Future Appointments  No visits were found meeting these conditions. Showing future appointments within next 150 days and meeting all other requirements       The patient was identified by name and date of birth. Lashell Angulo was informed that this is a telemedicine visit and that the visit is being conducted through the [address] [place] [address] [employer] platform. She agrees to proceed. .  My office door was closed. No one else was in the room. She acknowledged consent and understanding of privacy and security of the video platform. The patient has agreed to participate and understands they can discontinue the visit at any time. Patient is aware this is a billable service. Subjective  Lashell Angulo is a 34 y.o. female pregnant. HPI     Past Medical History:   Diagnosis Date   • Coronavirus infection     tested positive 4/20/20, never had fever, cleared via telemedicine   • COVID-19 virus infection 4/21/2020   • History of PCOS     getting periods regularly now   • Obesity, Class II, BMI 35-39.9    • Polycystic ovary syndrome    • Varicella vaccine        Past Surgical History:   Procedure Laterality Date   • EAR TUBE REMOVAL     • WISDOM TOOTH EXTRACTION         Current Outpatient Medications   Medication Sig Dispense Refill   • Blood Glucose Monitoring Suppl (OneTouch Verio Flex System) w/Device KIT Test 4 times daily 1 kit 0   • Lancets (OneTouch Delica Plus [id number]) MISC Test 4 times daily 100 each 4   • OneTouch Verio test strip Test 4 times daily 100 strip 4   • Prenatal Vit-Fe Fum-FA-Omega (Prenatal Multi +DHA) 27-0.8-228 MG CAPS Take 1 capsule by mouth daily 90 capsule 3     No current facility-administered medications for this visit. No Known Allergies    Review of Systems    Video Exam    There were no vitals filed for this visit.     Physical Exam     Visit Time  Total Visit Duration: 60min

## 2023-10-12 ENCOUNTER — ULTRASOUND (OUTPATIENT)
Facility: HOSPITAL | Age: 28
End: 2023-10-12
Payer: COMMERCIAL

## 2023-10-12 ENCOUNTER — HOSPITAL ENCOUNTER (OUTPATIENT)
Facility: HOSPITAL | Age: 28
Discharge: HOME | End: 2023-10-12
Attending: OBSTETRICS & GYNECOLOGY | Admitting: OBSTETRICS & GYNECOLOGY
Payer: COMMERCIAL

## 2023-10-12 VITALS
DIASTOLIC BLOOD PRESSURE: 68 MMHG | OXYGEN SATURATION: 97 % | SYSTOLIC BLOOD PRESSURE: 114 MMHG | HEART RATE: 71 BPM | BODY MASS INDEX: 36.65 KG/M2 | RESPIRATION RATE: 18 BRPM | WEIGHT: 220 LBS | TEMPERATURE: 98 F | HEIGHT: 65 IN

## 2023-10-12 VITALS
SYSTOLIC BLOOD PRESSURE: 122 MMHG | HEIGHT: 65 IN | BODY MASS INDEX: 36.69 KG/M2 | HEART RATE: 76 BPM | WEIGHT: 220.2 LBS | DIASTOLIC BLOOD PRESSURE: 76 MMHG

## 2023-10-12 DIAGNOSIS — O23.599 BACTERIAL VAGINOSIS IN PREGNANCY: Primary | ICD-10-CM

## 2023-10-12 DIAGNOSIS — O24.410 DIET CONTROLLED GESTATIONAL DIABETES MELLITUS (GDM) IN THIRD TRIMESTER: Primary | ICD-10-CM

## 2023-10-12 DIAGNOSIS — O26.03 EXCESSIVE WEIGHT GAIN DURING PREGNANCY IN THIRD TRIMESTER: ICD-10-CM

## 2023-10-12 DIAGNOSIS — Z3A.32 32 WEEKS GESTATION OF PREGNANCY: ICD-10-CM

## 2023-10-12 DIAGNOSIS — O34.10 UTERINE FIBROID DURING PREGNANCY, ANTEPARTUM: ICD-10-CM

## 2023-10-12 DIAGNOSIS — D25.9 UTERINE FIBROID DURING PREGNANCY, ANTEPARTUM: ICD-10-CM

## 2023-10-12 DIAGNOSIS — O99.213 OBESITY DURING PREGNANCY IN THIRD TRIMESTER: ICD-10-CM

## 2023-10-12 DIAGNOSIS — B96.89 BACTERIAL VAGINOSIS IN PREGNANCY: Primary | ICD-10-CM

## 2023-10-12 PROBLEM — Z3A.30 30 WEEKS GESTATION OF PREGNANCY: Status: ACTIVE | Noted: 2023-06-09

## 2023-10-12 LAB
BILIRUB UR QL STRIP: NEGATIVE
CLARITY UR: CLEAR
COLOR UR: YELLOW
GLUCOSE UR STRIP-MCNC: NEGATIVE MG/DL
HGB UR QL STRIP.AUTO: NEGATIVE
KETONES UR STRIP-MCNC: ABNORMAL MG/DL
LEUKOCYTE ESTERASE UR QL STRIP: ABNORMAL
NITRITE UR QL STRIP: NEGATIVE
PH UR STRIP.AUTO: 7 [PH] (ref 4.5–8)
PROT UR STRIP-MCNC: NEGATIVE MG/DL
SP GR UR STRIP.AUTO: 1.02 (ref 1–1.03)
UROBILINOGEN UR QL STRIP.AUTO: 0.2 E.U./DL

## 2023-10-12 PROCEDURE — 76816 OB US FOLLOW-UP PER FETUS: CPT | Performed by: OBSTETRICS & GYNECOLOGY

## 2023-10-12 PROCEDURE — NC001 PR NO CHARGE: Performed by: OBSTETRICS & GYNECOLOGY

## 2023-10-12 PROCEDURE — 99213 OFFICE O/P EST LOW 20 MIN: CPT

## 2023-10-12 PROCEDURE — 99214 OFFICE O/P EST MOD 30 MIN: CPT | Performed by: OBSTETRICS & GYNECOLOGY

## 2023-10-12 RX ORDER — METRONIDAZOLE 500 MG/1
500 TABLET ORAL EVERY 12 HOURS SCHEDULED
Qty: 14 TABLET | Refills: 0 | Status: SHIPPED | OUTPATIENT
Start: 2023-10-12 | End: 2023-10-19

## 2023-10-12 NOTE — PROGRESS NOTES
Sugar Rai is here at 30w4d and reports regular fetal movements and states that she feels like she is leaking fluid with the first episode Monday morning and reports it has occurred 2 or 3 times since then with the last episode being yesterday morning. She is here today for an ultrasound for fetal growth. She denies any foul-smelling discharge or any dysuria. She reports at times she will have stress incontinence but she does not feel that this is one of those times. Problem list:  Recent GDM A1 who has very normal blood sugars. I confirmed she last ate dinner at 5 pm the night prior to her glucola and started her drink at 7 am. Hence she was fasting for 14 hrs prior to her test which is longer then recommended and may be a partial cause of her significantly elevated results. She had a normal Glucola in her last pregnancy at 82 in 2020. Ultrasound findings: The ultrasound today shows normal interval fetal growth and fluid. Pregnancy ultrasound has limitations and is unable to detect all forms of fetal congenital abnormalities. The inaccuracy in the EFW can be off by 1 lb either way in the third trimester. Specific counseling was provided on the following problems:  If medications are required to control her diabetes she will require twice weekly fetal testing with NST's from 32 weeks on. I would also recommend delivery around her due date. If she does not require any medications to control her diabetes then she can start fetal testing at 40 weeks and be delivered by 41 weeks. Postnatally after delivery, most patients with gestational diabetes can stop their insulin and gestational diabetes diet. Recommend she complete a 2 hour glucose tolerance test at 6 weeks postpartum. Patients with gestational diabetes have a higher risk for developing overt diabetes in the future. Recommend she be screened for diabetes yearly.     Follow up recommended:   Recommend she proceed to labor and delivery to rule out ruptured membranes. Early in pregnancy she had a urine culture that showed low colony suggested contamination. Recommend a repeat UA C&S. Her sugars appear well controlled on diet. Over time if she continues to show good control on diet then we may be able to decrease her fingersticks to twice daily with a fbs and alternating the timing of her postprandial blood sugar checks,     If there is no evidence of rupture membranes then recommend a 6-week follow-up ultrasound for growth. Pre visit time reviewing her records   10 minutes  Face to face time 10 minutes  Post visit time on documentation of note, updating her problem list, adding orders and prescriptions 10 minutes. Procedures that were completed today were charged separately. The level of decision making was moderate complexity.     Kimo Stiles MD

## 2023-10-12 NOTE — LETTER
October 12, 2023     Paulette Carmen, [address] [facility],[address]  [address]    Patient: Crystal Lee   YOB: 1995   Date of Visit: 10/12/2023       Dear Dr. Milagros Florian: Thank you for referring Jordi Dick to me for evaluation. Below are my notes for this consultation. If you have questions, please do not hesitate to call me. I look forward to following your patient along with you. Sincerely,        Haseeb Richardson MD        CC: No Recipients    Haseeb Richardson MD  10/12/2023 12:48 PM  Sign when Signing Visit  Crystal Lee is here at 30w4d and reports regular fetal movements and states that she feels like she is leaking fluid with the first episode Monday morning and reports it has occurred 2 or 3 times since then with the last episode being yesterday morning. She is here today for an ultrasound for fetal growth. She denies any foul-smelling discharge or any dysuria. She reports at times she will have stress incontinence but she does not feel that this is one of those times. Problem list:  Recent GDM A1 who has very normal blood sugars. I confirmed she last ate dinner at 5 pm the night prior to her glucola and started her drink at 7 am. Hence she was fasting for 14 hrs prior to her test which is longer then recommended and may be a partial cause of her significantly elevated results. She had a normal Glucola in her last pregnancy at 82 in 2020. Ultrasound findings: The ultrasound today shows normal interval fetal growth and fluid. Pregnancy ultrasound has limitations and is unable to detect all forms of fetal congenital abnormalities. The inaccuracy in the EFW can be off by 1 lb either way in the third trimester. Specific counseling was provided on the following problems:  If medications are required to control her diabetes she will require twice weekly fetal testing with NST's from 32 weeks on.  I would also recommend delivery around her due date. If she does not require any medications to control her diabetes then she can start fetal testing at 40 weeks and be delivered by 41 weeks. Postnatally after delivery, most patients with gestational diabetes can stop their insulin and gestational diabetes diet. Recommend she complete a 2 hour glucose tolerance test at 6 weeks postpartum. Patients with gestational diabetes have a higher risk for developing overt diabetes in the future. Recommend she be screened for diabetes yearly. Follow up recommended:   Recommend she proceed to labor and delivery to rule out ruptured membranes. Early in pregnancy she had a urine culture that showed low colony suggested contamination. Recommend a repeat UA C&S. Her sugars appear well controlled on diet. Over time if she continues to show good control on diet then we may be able to decrease her fingersticks to twice daily with a fbs and alternating the timing of her postprandial blood sugar checks,     If there is no evidence of rupture membranes then recommend a 6-week follow-up ultrasound for growth. Pre visit time reviewing her records   10 minutes  Face to face time 10 minutes  Post visit time on documentation of note, updating her problem list, adding orders and prescriptions 10 minutes. Procedures that were completed today were charged separately. The level of decision making was moderate complexity.     Mason Reyna MD

## 2023-10-12 NOTE — PROGRESS NOTES
L&D Triage Note - OB/GYN  Fransisco Ganser 34 y.o. female MRN: [id number]  Unit/Bed#: LD PACU-03 Encounter: [id number]      ASSESSMENT:    Fransisco Ganser is a 34 y.o.  at 30w4d presenting to triage with leakage of fluid. R/o ROM is negative and exam confirms bacterial vaginosis. Patient appropriate for discharge    PLAN:    1) Bacterial vaginosis  -500mg flagyl BID x 7 days  2) Leakage of fluid  -R/o ROM negative  -Nitrazine yellow, neg ferning, neg pooling  -NST reactive  -SHIRA 14cm  2) Continue routine prenatal care  3) Discharge from [facility] triage with  labor precautions    - Reviewed rupture of membranes, false vs true labor, decreased fetal movement, and vaginal bleeding   - Pt to call provider with any concerns and follow up at her next scheduled prenatal appointment    - Case discussed with Dr. Marquez Heading:    Fransisco Ganser 34 y.o. Shruthi Ortiz at 30w4d with an Estimated Date of Delivery: 23 presenting for evaluation of fluid leakage. Patient reports feeling a gush of fluid on Monday that leaked through her pants. Since then, she has felt small amounts of fluid leakage on and off. Patient describes the fluid as clear and odorless and denies any vaginal itching. She denies any vaginal bleeding or contractions and endorses regular fetal movement.     Her current obstetrical history is significant for A1GDM    Her past obstetrical history is significant for term       OBJECTIVE:    Vitals:    10/12/23 1204   BP: 114/68   Pulse: 71   Resp: 18   Temp: 98 °F (36.7 °C)   SpO2: 97%       ROS:  Constitutional: Negative  Respiratory: Negative  Cardiovascular: Negative    Gastrointestinal: Negative    General Physical Exam:  General: in no apparent distress  Lungs: non-labored breathing  Abdomen: abdomen is soft without significant tenderness, masses, organomegaly or guarding  Lower extremeties: nontender    Cervical Exam  Speculum: Cervical os is closed, presence of thin white discharge and cervical mucus, absence of bleeding, absence of pooling    FHT:  Baseline Rate: 125 bpm  Variability: Moderate 6-25 bpm  Accelerations: 10 x 10 (<32 weeks), At variable times  FHR Category: Category I    TOCO:   Contraction Frequency (minutes): 0  Contraction Duration (seconds): 0  Contraction Quality: Not applicable    Lab Results   Component Value Date    WBC 7.45 09/23/2023    HGB 11.7 09/23/2023    HCT 36.8 09/23/2023     09/23/2023     Lab Results   Component Value Date    K 3.8 10/09/2020     10/09/2020    CO2 23 10/09/2020    BUN 9 10/09/2020    CREATININE 0.83 10/09/2020    AST 30 10/09/2020    ALT 57 10/09/2020       KOH/WTMT:     Infection:   - many clue cells    - no hyphae   - no trichomonads present    Membrane status   - neg ferning   - neg nitrazene   - no pooling     Urine Dip    - trace leukocytes,     Imaging:     As per Dr. Valdez Masters:  AMNIOTIC FLUID     Q1: 2.4      Q2: 4.6      Q3: 5.4      Q4: 2.3  SHIRA Total = 14.7 cm  Amniotic Fluid: Normal       Bel Sebastian MD,  OBGYN PGY-2  10/12/2023 1:06 PM

## 2023-10-12 NOTE — LETTER
October 12, 2023     Komal Diaz MD  [address] [facility]    Patient: Lashell Angulo   YOB: 1995   Date of Visit: 10/12/2023       Dear Dr. Jeannine Rocha: Thank you for referring Zaheer Trevino to me for evaluation. Below are my notes for this consultation. If you have questions, please do not hesitate to call me. I look forward to following your patient along with you. Sincerely,        Margarita Benavides MD        CC: No Recipients    Margarita Benavides MD  10/12/2023 12:48 PM  Sign when Signing Visit  Lashell Angulo is here at 30w4d and reports regular fetal movements and states that she feels like she is leaking fluid with the first episode Monday morning and reports it has occurred 2 or 3 times since then with the last episode being yesterday morning. She is here today for an ultrasound for fetal growth. She denies any foul-smelling discharge or any dysuria. She reports at times she will have stress incontinence but she does not feel that this is one of those times. Problem list:  Recent GDM A1 who has very normal blood sugars. I confirmed she last ate dinner at 5 pm the night prior to her glucola and started her drink at 7 am. Hence she was fasting for 14 hrs prior to her test which is longer then recommended and may be a partial cause of her significantly elevated results. She had a normal Glucola in her last pregnancy at 80 in 2020. Ultrasound findings: The ultrasound today shows normal interval fetal growth and fluid. Pregnancy ultrasound has limitations and is unable to detect all forms of fetal congenital abnormalities. The inaccuracy in the EFW can be off by 1 lb either way in the third trimester. Specific counseling was provided on the following problems:  If medications are required to control her diabetes she will require twice weekly fetal testing with NST's from 32 weeks on.  I would also recommend delivery around her due date. If she does not require any medications to control her diabetes then she can start fetal testing at 40 weeks and be delivered by 41 weeks. Postnatally after delivery, most patients with gestational diabetes can stop their insulin and gestational diabetes diet. Recommend she complete a 2 hour glucose tolerance test at 6 weeks postpartum. Patients with gestational diabetes have a higher risk for developing overt diabetes in the future. Recommend she be screened for diabetes yearly. Follow up recommended:   Recommend she proceed to labor and delivery to rule out ruptured membranes. Early in pregnancy she had a urine culture that showed low colony suggested contamination. Recommend a repeat UA C&S. Her sugars appear well controlled on diet. Over time if she continues to show good control on diet then we may be able to decrease her fingersticks to twice daily with a fbs and alternating the timing of her postprandial blood sugar checks,     If there is no evidence of rupture membranes then recommend a 6-week follow-up ultrasound for growth. Pre visit time reviewing her records   10 minutes  Face to face time 10 minutes  Post visit time on documentation of note, updating her problem list, adding orders and prescriptions 10 minutes. Procedures that were completed today were charged separately. The level of decision making was moderate complexity.     Justin Barnes MD

## 2023-10-17 ENCOUNTER — ROUTINE PRENATAL (OUTPATIENT)
Dept: OBGYN CLINIC | Facility: MEDICAL CENTER | Age: 28
End: 2023-10-17

## 2023-10-17 VITALS — WEIGHT: 219 LBS | DIASTOLIC BLOOD PRESSURE: 64 MMHG | SYSTOLIC BLOOD PRESSURE: 112 MMHG | BODY MASS INDEX: 36.44 KG/M2

## 2023-10-17 DIAGNOSIS — Z34.93 THIRD TRIMESTER PREGNANCY: Primary | ICD-10-CM

## 2023-10-17 DIAGNOSIS — Z78.9 NONIMMUNE TO HEPATITIS B VIRUS: ICD-10-CM

## 2023-10-17 DIAGNOSIS — Z3A.31 31 WEEKS GESTATION OF PREGNANCY: ICD-10-CM

## 2023-10-17 DIAGNOSIS — O09.899 RUBELLA NON-IMMUNE STATUS, ANTEPARTUM: ICD-10-CM

## 2023-10-17 DIAGNOSIS — Z28.39 RUBELLA NON-IMMUNE STATUS, ANTEPARTUM: ICD-10-CM

## 2023-10-17 DIAGNOSIS — O24.410 DIET CONTROLLED GESTATIONAL DIABETES MELLITUS (GDM) IN THIRD TRIMESTER: ICD-10-CM

## 2023-10-17 PROCEDURE — PNV: Performed by: OBSTETRICS & GYNECOLOGY

## 2023-10-17 NOTE — PROGRESS NOTES
Assessment  34 y.o.  at 31w2d presenting for routine prenatal visit. Plan  Diagnoses and all orders for this visit:    Third trimester pregnancy  31 weeks gestation of pregnancy  - PTL precautions  - FKC  - Return in 2wks for PN    Diet controlled gestational diabetes mellitus (GDM) in third trimester  - Follows with MFM  - Continue dietary modifications, glucose monitoring and reporting  - Ongoing growth surveillance    Nonimmune to hepatitis B virus    Rubella non-immune status, antepartum      ____________________________________________________________        Subjective    Natacha Ewing is a 34 y.o.  at 31w2d who presents for routine prenatal visit. She is without complaint. She denies contractions, loss of fluid, or vaginal bleeding. She feels regular fetal movements. Pregnancy Problems:  Patient Active Problem List   Diagnosis    Seasonal allergies    32 weeks gestation of pregnancy    Obesity in pregnancy    Nonimmune to hepatitis B virus    Rubella non-immune status, antepartum    Subchorionic hematoma in first trimester    Uterine fibroid during pregnancy, antepartum    Excessive weight gain during pregnancy in third trimester    Lactating mother    Diet controlled gestational diabetes mellitus (GDM) in third trimester         Objective  /64   Wt 99.3 kg (219 lb)   LMP 2023 (Exact Date)   BMI 36.44 kg/m²     FHT: 132 BPM   Uterine Size: size equals dates     Physical Exam:  Physical Exam  Constitutional:       General: She is not in acute distress. Appearance: Normal appearance. She is well-developed. She is not ill-appearing, toxic-appearing or diaphoretic. HENT:      Head: Normocephalic and atraumatic. Eyes:      General: No scleral icterus. Right eye: No discharge. Left eye: No discharge. Conjunctiva/sclera: Conjunctivae normal.   Pulmonary:      Effort: Pulmonary effort is normal. No accessory muscle usage or respiratory distress. Abdominal:      General: There is distension (gravid). Tenderness: There is no abdominal tenderness. There is no guarding or rebound. Skin:     General: Skin is warm and dry. Coloration: Skin is not jaundiced. Findings: No bruising, erythema or rash. Neurological:      Mental Status: She is alert. Psychiatric:         Mood and Affect: Mood normal.         Behavior: Behavior normal.         Thought Content:  Thought content normal.         Judgment: Judgment normal.

## 2023-11-01 ENCOUNTER — ROUTINE PRENATAL (OUTPATIENT)
Dept: OBGYN CLINIC | Facility: MEDICAL CENTER | Age: 28
End: 2023-11-01

## 2023-11-01 VITALS — SYSTOLIC BLOOD PRESSURE: 116 MMHG | DIASTOLIC BLOOD PRESSURE: 84 MMHG | BODY MASS INDEX: 37.01 KG/M2 | WEIGHT: 222.4 LBS

## 2023-11-01 DIAGNOSIS — Z3A.33 33 WEEKS GESTATION OF PREGNANCY: ICD-10-CM

## 2023-11-01 DIAGNOSIS — O34.10 UTERINE FIBROID DURING PREGNANCY, ANTEPARTUM: ICD-10-CM

## 2023-11-01 DIAGNOSIS — O26.03 EXCESSIVE WEIGHT GAIN DURING PREGNANCY IN THIRD TRIMESTER: ICD-10-CM

## 2023-11-01 DIAGNOSIS — O24.410 DIET CONTROLLED GESTATIONAL DIABETES MELLITUS (GDM) IN THIRD TRIMESTER: Primary | ICD-10-CM

## 2023-11-01 DIAGNOSIS — D25.9 UTERINE FIBROID DURING PREGNANCY, ANTEPARTUM: ICD-10-CM

## 2023-11-01 DIAGNOSIS — O99.210 OBESITY IN PREGNANCY: ICD-10-CM

## 2023-11-01 PROCEDURE — PNV: Performed by: NURSE PRACTITIONER

## 2023-11-01 NOTE — PROGRESS NOTES
Denies loss of fluid, vaginal bleeding and abdominal pain. Confirms frequent fetal movement. Doing fetal kick counts. Tolerating prenatal vitamin well. Fasting blood sugars 70-90 and 2-hour postprandials 80-90. Received breast pump. Denies questions or concerns at today's visit. BP: 116/84 weight: +34lbs  Plan  -Continue prenatal vitamins daily  -Continue fetal kick counts daily  -Vaginal/perineal massage reviewed and encouraged 1-4 times per week starting at 34 gestational weeks. Written information provided in 28-week folder  -GDM encouraged to continue following diet, checking blood sugars and close contact with diabetes in pregnancy program  -Follow-up with  center scheduled for 23  -Common discomforts of pregnancy and precautions including  labor reviewed. Signs and symptoms to report reviewed.   RTO 2 weeks

## 2023-11-06 ENCOUNTER — DOCUMENTATION (OUTPATIENT)
Dept: PERINATAL CARE | Facility: CLINIC | Age: 28
End: 2023-11-06

## 2023-11-06 NOTE — PROGRESS NOTES
Date: 11/06/23  Lorenza Gardiner  1995  Estimated Date of Delivery: 12/17/23  34w1d  OB/GYN:[facility]    Diagnosis:  Diet controlled GDM    Blood Sugar Logs Submitted via: Glucose Flowsheet          Assessment and Plan:  No diabetes related medications Advised patient to et us know if you has symptoms of hypoglycemia when her FBS is in the 60's.   2200 calorie (CHO:39-52-03-30-60-30) (PRO: 3-2-3/4-2-3/4-2) meal plan consisting of 3 meals and 3 snacks daily including protein at each  Advised patient to continue current meal plan  Avoid fasting for > 10 hours overnight  Continue SMBG 4 x per day (Fasting, 2 hour after start of each meal) with a One-Touch Verio gluocse meter.    If okay by physician, recommend up to 30 minutes of physical activity daily     Lab Work:   No results found for: "HGBA1C"     Ultrasound:       Date:10/12/23       Fetal Growth: Normal       SHIRA: Normal  Next 11/27/23    Diabetes Self Management Support Plan outside of ongoing care: Spouse/Family   Patient Stated Goal: "I will walk 20-30 minutes after dinner daily"   Goal Assessment: Not on track    Date to report next: weekly     Rhys Beaver, MS, RD, [facility]  Diabetes Educator  [facility]  Diabetes in Pregnancy Program  [facility], [address] [place] [address]

## 2023-11-17 ENCOUNTER — ROUTINE PRENATAL (OUTPATIENT)
Dept: OBGYN CLINIC | Facility: MEDICAL CENTER | Age: 28
End: 2023-11-17

## 2023-11-17 VITALS — BODY MASS INDEX: 38.11 KG/M2 | DIASTOLIC BLOOD PRESSURE: 72 MMHG | WEIGHT: 229 LBS | SYSTOLIC BLOOD PRESSURE: 118 MMHG

## 2023-11-17 DIAGNOSIS — Z3A.35 35 WEEKS GESTATION OF PREGNANCY: ICD-10-CM

## 2023-11-17 DIAGNOSIS — O26.03 EXCESSIVE WEIGHT GAIN DURING PREGNANCY IN THIRD TRIMESTER: ICD-10-CM

## 2023-11-17 DIAGNOSIS — O24.410 DIET CONTROLLED GESTATIONAL DIABETES MELLITUS (GDM) IN THIRD TRIMESTER: Primary | ICD-10-CM

## 2023-11-17 PROCEDURE — PNV: Performed by: STUDENT IN AN ORGANIZED HEALTH CARE EDUCATION/TRAINING PROGRAM

## 2023-11-17 NOTE — PROGRESS NOTES
Routine Prenatal Visit  [facility] [address] [place] [address]    Assessment/Plan:  Zahra Tejeda is a 34y.o. year old  at 29w6d who presents for routine prenatal visit. 1. Diet controlled gestational diabetes mellitus (GDM) in third trimester    2. Excessive weight gain during pregnancy in third trimester    3. 35 weeks gestation of pregnancy          Subjective:     CC: Prenatal care    Kevin Rowe is a 34 y.o. Kerri Nurse female who presents for routine prenatal care at 35w5d. Pregnancy ROS: Denies leakage of fluid, pelvic pain, or vaginal bleeding. Reports normal fetal movement. The following portions of the patient's history were reviewed and updated as appropriate: allergies, current medications, past family history, past medical history, obstetric history, gynecologic history, past social history, past surgical history and problem list.      Objective:  /72   Wt 104 kg (229 lb)   LMP 2023 (Exact Date)   BMI 38.11 kg/m²   Pregravid Weight/BMI: 85.3 kg (188 lb) (BMI 31.28)  Current Weight: 104 kg (229 lb)   Total Weight Gain: 18.6 kg (41 lb)   Pre- Vitals      Flowsheet Row Most Recent Value   Prenatal Assessment    Fetal Heart Rate 165   Movement Present   Prenatal Vitals    Blood Pressure 118/72   Weight - Scale 104 kg (229 lb)   Urine Albumin/Glucose    Dilation/Effacement/Station    Vaginal Drainage    Draining Fluid Yes   Edema    LLE Edema None   RLE Edema None   Facial Edema None             General: Well appearing, no distress  Respiratory: Unlabored breathing  Cardiovascular: Regular rate. Abdomen: Soft, gravid, nontender  Fundal Height: Appropriate for gestational age. Extremities: Warm and well perfused. Non tender.     Maryana Feng MD  [id number] [place]  2023 3:59 PM

## 2023-11-27 ENCOUNTER — ULTRASOUND (OUTPATIENT)
Facility: HOSPITAL | Age: 28
End: 2023-11-27
Payer: COMMERCIAL

## 2023-11-27 VITALS
HEART RATE: 92 BPM | BODY MASS INDEX: 38.19 KG/M2 | SYSTOLIC BLOOD PRESSURE: 118 MMHG | DIASTOLIC BLOOD PRESSURE: 74 MMHG | HEIGHT: 65 IN | WEIGHT: 229.2 LBS

## 2023-11-27 DIAGNOSIS — O24.410 DIET CONTROLLED GESTATIONAL DIABETES MELLITUS (GDM) IN THIRD TRIMESTER: Primary | ICD-10-CM

## 2023-11-27 DIAGNOSIS — Z36.89 ENCOUNTER FOR ULTRASOUND TO CHECK FETAL GROWTH: ICD-10-CM

## 2023-11-27 DIAGNOSIS — Z3A.35 35 WEEKS GESTATION OF PREGNANCY: ICD-10-CM

## 2023-11-27 DIAGNOSIS — O99.210 OBESITY IN PREGNANCY: ICD-10-CM

## 2023-11-27 PROCEDURE — 76816 OB US FOLLOW-UP PER FETUS: CPT | Performed by: OBSTETRICS & GYNECOLOGY

## 2023-11-27 NOTE — PATIENT INSTRUCTIONS
Thank you for choosing us for your  care today. If you have any questions about your ultrasound or care, please do not hesitate to contact us or your primary obstetrician. Some general instructions for your pregnancy are:    Protect against coronavirus: get vaccinated - pregnant women are increased risk of severe COVID. Notify your primary care doctor if you have any symptoms. Exercise: Aim for 22 minutes per day (150 minutes per week) of regular exercise. Walking is great! Nutrition: aim for calcium-rich and iron-rich foods as well as healthy sources of protein. Learn about Preeclampsia: preeclampsia is a common, serious high blood pressure complication in pregnancy. A blood pressure of 801OZXS (systolic or top number) or 30MDMN (diastolic or bottom number) is not normal and needs evaluation by your doctor. Aspirin is sometimes prescribed in early pregnancy to prevent preeclampsia in women with risk factors - ask your obstetrician if you should be on this medication. If you smoke, try to reduce how many cigarettes you smoke or try to quit completely. Do not vape. Other warning signs to watch out for in pregnancy or postpartum: chest pain, obstructed breathing or shortness of breath, seizures, thoughts of hurting yourself or your baby, bleeding, a painful or swollen leg, fever, or headache (see [facility] POST-BIRTH Warning Signs campaign). If these happen call 911. Itching is also not normal in pregnancy and if you experience this, especially over your hands and feet, potentially worse at night, notify your doctors.

## 2023-11-27 NOTE — PROGRESS NOTES
[address] [facility] [address]: Jennifer Mcgrath was seen today for fetal growth assessment ultrasound. See ultrasound report under "OB Procedures" tab. The time spent on this established patient on the encounter date included 5 minutes previsit service time reviewing records and precharting, 6 minutes face-to-face service time counseling regarding results and coordinating care, and  4 minutes charting, totalling 15 minutes.   Please don't hesitate to contact our office with any concerns or questions.  -Barbara Garza MD

## 2023-11-29 PROBLEM — Z3A.37 37 WEEKS GESTATION OF PREGNANCY: Status: ACTIVE | Noted: 2023-06-09

## 2023-11-30 ENCOUNTER — ROUTINE PRENATAL (OUTPATIENT)
Dept: OBGYN CLINIC | Facility: MEDICAL CENTER | Age: 28
End: 2023-11-30

## 2023-11-30 VITALS — BODY MASS INDEX: 38.29 KG/M2 | DIASTOLIC BLOOD PRESSURE: 70 MMHG | SYSTOLIC BLOOD PRESSURE: 116 MMHG | WEIGHT: 230.1 LBS

## 2023-11-30 DIAGNOSIS — O34.10 UTERINE FIBROID DURING PREGNANCY, ANTEPARTUM: ICD-10-CM

## 2023-11-30 DIAGNOSIS — Z78.9 NONIMMUNE TO HEPATITIS B VIRUS: ICD-10-CM

## 2023-11-30 DIAGNOSIS — Z34.93 THIRD TRIMESTER PREGNANCY: ICD-10-CM

## 2023-11-30 DIAGNOSIS — D25.9 UTERINE FIBROID DURING PREGNANCY, ANTEPARTUM: ICD-10-CM

## 2023-11-30 DIAGNOSIS — O99.210 OBESITY IN PREGNANCY: ICD-10-CM

## 2023-11-30 DIAGNOSIS — Z28.39 RUBELLA NON-IMMUNE STATUS, ANTEPARTUM: ICD-10-CM

## 2023-11-30 DIAGNOSIS — Z3A.37 37 WEEKS GESTATION OF PREGNANCY: ICD-10-CM

## 2023-11-30 DIAGNOSIS — O09.899 RUBELLA NON-IMMUNE STATUS, ANTEPARTUM: ICD-10-CM

## 2023-11-30 DIAGNOSIS — O24.410 DIET CONTROLLED GESTATIONAL DIABETES MELLITUS (GDM) IN THIRD TRIMESTER: ICD-10-CM

## 2023-11-30 PROCEDURE — 87150 DNA/RNA AMPLIFIED PROBE: CPT | Performed by: ADVANCED PRACTICE MIDWIFE

## 2023-11-30 PROCEDURE — PNV: Performed by: ADVANCED PRACTICE MIDWIFE

## 2023-11-30 NOTE — PROGRESS NOTES
Routine Prenatal Visit  [facility] [address] [place] [address]    Assessment/Plan:  Dunia Diane is a 34y.o. year old  at 37w4d who presents for routine prenatal visit. 1. Diet controlled gestational diabetes mellitus (GDM) in third trimester    2. Uterine fibroid during pregnancy, antepartum    3. Rubella non-immune status, antepartum    4. 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 Obesity in pregnancy    6. Third trimester pregnancy  -     Strep B DNA probe, amplification    7. 37 weeks gestation of pregnancy  Assessment & Plan:  - reviewed s/s labor, FKC  -- Reports sugars to Diabetes in Pregnancy      avg FBS 95  avg 2 hr   - GBS today  - birth plan- will bring next visit  - Feeding: Breastfeeding  - Ped: unsure of Ped.   - next visit 1 week    Orders:  -     Strep B DNA probe, amplification          Subjective:     CC: Prenatal care    Bobbi Cast is a 34 y.o.  female who presents for routine prenatal care at 37w4d. Pregnancy ROS: no leakage of fluid, pelvic pain, or vaginal bleeding. Good fetal movement. Occasional ctx.      The following portions of the patient's history were reviewed and updated as appropriate: allergies, current medications, past family history, past medical history, obstetric history, gynecologic history, past social history, past surgical history and problem list.      Objective:  /70   Wt 104 kg (230 lb 1.6 oz)   LMP 2023 (Exact Date)   BMI 38.29 kg/m²   Pregravid Weight/BMI: 85.3 kg (188 lb) (BMI 31.28)  Current Weight: 104 kg (230 lb 1.6 oz)   Total Weight Gain: 19.1 kg (42 lb 1.6 oz)   Pre-Joe Vitals      Flowsheet Row Most Recent Value   Prenatal Assessment    Fetal Heart Rate 122   Movement Present   Prenatal Vitals    Blood Pressure 116/70   Weight - Scale 104 kg (230 lb 1.6 oz)   Urine Albumin/Glucose    Dilation/Effacement/Station    Vaginal Drainage    Edema    LLE Edema None   RLE Edema None General: Well appearing, no distress  Respiratory: Unlabored breathing  Cardiovascular: Regular rate. Abdomen: Soft, gravid, nontender  Fundal Height: Appropriate for gestational age. Extremities: Warm and well perfused. Non tender.

## 2023-11-30 NOTE — ASSESSMENT & PLAN NOTE
- reviewed s/s labor, FKC  -- Reports sugars to Diabetes in Pregnancy      avg FBS 95  avg 2 hr   - GBS today  - birth plan- will bring next visit  - Feeding: Breastfeeding  - Ped: unsure of Ped.   - next visit 1 week

## 2023-12-03 LAB — GP B STREP DNA SPEC QL NAA+PROBE: NEGATIVE

## 2023-12-04 ENCOUNTER — TELEPHONE (OUTPATIENT)
Dept: OBGYN CLINIC | Facility: CLINIC | Age: 28
End: 2023-12-04

## 2023-12-08 ENCOUNTER — ROUTINE PRENATAL (OUTPATIENT)
Dept: OBGYN CLINIC | Facility: MEDICAL CENTER | Age: 28
End: 2023-12-08

## 2023-12-08 VITALS — BODY MASS INDEX: 38.61 KG/M2 | SYSTOLIC BLOOD PRESSURE: 124 MMHG | WEIGHT: 232 LBS | DIASTOLIC BLOOD PRESSURE: 80 MMHG

## 2023-12-08 DIAGNOSIS — O24.410 DIET CONTROLLED GESTATIONAL DIABETES MELLITUS (GDM) IN THIRD TRIMESTER: ICD-10-CM

## 2023-12-08 DIAGNOSIS — Z3A.38 38 WEEKS GESTATION OF PREGNANCY: Primary | ICD-10-CM

## 2023-12-08 PROCEDURE — PNV: Performed by: OBSTETRICS & GYNECOLOGY

## 2023-12-08 NOTE — PROGRESS NOTES
Susie Moura is a 34y.o. year old  at 38w5d for routine prenatal visit.   + FM, no vaginal bleeding, contractions, or LOF  Complaints: No   Most recent ultrasound and labs reviewed.   A1GDM  - well controlled   States has IOL scheduled for night of     IOL process discussed with patient   [facility] reviewed as well as labor precautions

## 2023-12-12 ENCOUNTER — HOSPITAL ENCOUNTER (OUTPATIENT)
Dept: LABOR AND DELIVERY | Facility: HOSPITAL | Age: 28
Discharge: HOME | End: 2023-12-12
Payer: COMMERCIAL

## 2023-12-12 ENCOUNTER — HOSPITAL ENCOUNTER (INPATIENT)
Facility: HOSPITAL | Age: 28
LOS: 3 days | Discharge: HOME | End: 2023-12-15
Attending: OBSTETRICS & GYNECOLOGY | Admitting: OBSTETRICS & GYNECOLOGY
Payer: COMMERCIAL

## 2023-12-12 DIAGNOSIS — Z3A.39 39 WEEKS GESTATION OF PREGNANCY: Primary | ICD-10-CM

## 2023-12-12 LAB
ABO GROUP BLD: NORMAL
BASOPHILS # BLD AUTO: 0.02 THOUSANDS/ÂΜL (ref 0–0.1)
BASOPHILS NFR BLD AUTO: 0 % (ref 0–1)
BLD GP AB SCN SERPL QL: NEGATIVE
EOSINOPHIL # BLD AUTO: 0.09 THOUSAND/ÂΜL (ref 0–0.61)
EOSINOPHIL NFR BLD AUTO: 1 % (ref 0–6)
ERYTHROCYTE [DISTWIDTH] IN BLOOD BY AUTOMATED COUNT: 14 % (ref 11.6–15.1)
GLUCOSE SERPL-MCNC: 64 MG/DL (ref 65–140)
HCT VFR BLD AUTO: 36.1 % (ref 34.8–46.1)
HGB BLD-MCNC: 11.8 G/DL (ref 11.5–15.4)
HOLD SPECIMEN: YES
IMM GRANULOCYTES # BLD AUTO: 0.07 THOUSAND/UL (ref 0–0.2)
IMM GRANULOCYTES NFR BLD AUTO: 1 % (ref 0–2)
LYMPHOCYTES # BLD AUTO: 1.88 THOUSANDS/ÂΜL (ref 0.6–4.47)
LYMPHOCYTES NFR BLD AUTO: 18 % (ref 14–44)
MCH RBC QN AUTO: 28.4 PG (ref 26.8–34.3)
MCHC RBC AUTO-ENTMCNC: 32.7 G/DL (ref 31.4–37.4)
MCV RBC AUTO: 87 FL (ref 82–98)
MONOCYTES # BLD AUTO: 0.9 THOUSAND/ÂΜL (ref 0.17–1.22)
MONOCYTES NFR BLD AUTO: 9 % (ref 4–12)
NEUTROPHILS # BLD AUTO: 7.25 THOUSANDS/ÂΜL (ref 1.85–7.62)
NEUTS SEG NFR BLD AUTO: 71 % (ref 43–75)
NRBC BLD AUTO-RTO: 0 /100 WBCS
PLATELET # BLD AUTO: 175 THOUSANDS/UL (ref 149–390)
PMV BLD AUTO: 10.7 FL (ref 8.9–12.7)
RBC # BLD AUTO: 4.15 MILLION/UL (ref 3.81–5.12)
RH BLD: POSITIVE
SPECIMEN EXPIRATION DATE: NORMAL
WBC # BLD AUTO: 10.21 THOUSAND/UL (ref 4.31–10.16)

## 2023-12-12 PROCEDURE — 86900 BLOOD TYPING SEROLOGIC ABO: CPT

## 2023-12-12 PROCEDURE — 86850 RBC ANTIBODY SCREEN: CPT

## 2023-12-12 PROCEDURE — NC001 PR NO CHARGE: Performed by: OBSTETRICS & GYNECOLOGY

## 2023-12-12 PROCEDURE — 85025 COMPLETE CBC W/AUTO DIFF WBC: CPT

## 2023-12-12 PROCEDURE — 86901 BLOOD TYPING SEROLOGIC RH(D): CPT

## 2023-12-12 PROCEDURE — 80053 COMPREHEN METABOLIC PANEL: CPT

## 2023-12-12 PROCEDURE — 4A1HXCZ MONITORING OF PRODUCTS OF CONCEPTION, CARDIAC RATE, EXTERNAL APPROACH: ICD-10-PCS | Performed by: OBSTETRICS & GYNECOLOGY

## 2023-12-12 PROCEDURE — 86780 TREPONEMA PALLIDUM: CPT

## 2023-12-12 PROCEDURE — 82948 REAGENT STRIP/BLOOD GLUCOSE: CPT

## 2023-12-12 RX ORDER — SODIUM CHLORIDE 9 MG/ML
125 INJECTION, SOLUTION INTRAVENOUS CONTINUOUS
Status: DISCONTINUED | OUTPATIENT
Start: 2023-12-12 | End: 2023-12-12

## 2023-12-12 RX ORDER — SODIUM CHLORIDE 9 MG/ML
125 INJECTION, SOLUTION INTRAVENOUS CONTINUOUS
Status: DISCONTINUED | OUTPATIENT
Start: 2023-12-12 | End: 2023-12-14

## 2023-12-12 RX ORDER — ACETAMINOPHEN 325 MG/1
650 TABLET ORAL EVERY 6 HOURS PRN
Status: DISCONTINUED | OUTPATIENT
Start: 2023-12-12 | End: 2023-12-14

## 2023-12-12 RX ORDER — CALCIUM CARBONATE 500 MG/1
1000 TABLET, CHEWABLE ORAL 2 TIMES DAILY PRN
Status: DISCONTINUED | OUTPATIENT
Start: 2023-12-12 | End: 2023-12-14

## 2023-12-12 RX ORDER — SODIUM CHLORIDE, SODIUM LACTATE, POTASSIUM CHLORIDE, CALCIUM CHLORIDE 600; 310; 30; 20 MG/100ML; MG/100ML; MG/100ML; MG/100ML
125 INJECTION, SOLUTION INTRAVENOUS CONTINUOUS
Status: DISCONTINUED | OUTPATIENT
Start: 2023-12-12 | End: 2023-12-12

## 2023-12-12 RX ORDER — ONDANSETRON 2 MG/ML
4 INJECTION INTRAMUSCULAR; INTRAVENOUS EVERY 6 HOURS PRN
Status: DISCONTINUED | OUTPATIENT
Start: 2023-12-12 | End: 2023-12-14

## 2023-12-12 RX ORDER — BUPIVACAINE HYDROCHLORIDE 2.5 MG/ML
30 INJECTION, SOLUTION EPIDURAL; INFILTRATION; INTRACAUDAL ONCE AS NEEDED
Status: DISCONTINUED | OUTPATIENT
Start: 2023-12-12 | End: 2023-12-14

## 2023-12-13 ENCOUNTER — ANESTHESIA (INPATIENT)
Dept: ANESTHESIOLOGY | Facility: HOSPITAL | Age: 28
End: 2023-12-13
Payer: COMMERCIAL

## 2023-12-13 ENCOUNTER — ANESTHESIA EVENT (INPATIENT)
Dept: ANESTHESIOLOGY | Facility: HOSPITAL | Age: 28
End: 2023-12-13
Payer: COMMERCIAL

## 2023-12-13 LAB
ALBUMIN SERPL BCP-MCNC: 3.8 G/DL (ref 3.5–5)
ALP SERPL-CCNC: 141 U/L (ref 34–104)
ALT SERPL W P-5'-P-CCNC: 13 U/L (ref 7–52)
ANION GAP SERPL CALCULATED.3IONS-SCNC: 7 MMOL/L
AST SERPL W P-5'-P-CCNC: 15 U/L (ref 13–39)
BASE EXCESS BLDCOA CALC-SCNC: -5.1 MMOL/L (ref 3–11)
BILIRUB SERPL-MCNC: 0.27 MG/DL (ref 0.2–1)
BUN SERPL-MCNC: 13 MG/DL (ref 5–25)
CALCIUM SERPL-MCNC: 9.2 MG/DL (ref 8.4–10.2)
CHLORIDE SERPL-SCNC: 107 MMOL/L (ref 96–108)
CO2 SERPL-SCNC: 22 MMOL/L (ref 21–32)
CREAT SERPL-MCNC: 0.52 MG/DL (ref 0.6–1.3)
CREAT UR-MCNC: 73.3 MG/DL
GFR SERPL CREATININE-BSD FRML MDRD: 132 ML/MIN/1.73SQ M
GLUCOSE SERPL-MCNC: 101 MG/DL (ref 65–140)
GLUCOSE SERPL-MCNC: 59 MG/DL (ref 65–140)
GLUCOSE SERPL-MCNC: 69 MG/DL (ref 65–140)
GLUCOSE SERPL-MCNC: 71 MG/DL (ref 65–140)
GLUCOSE SERPL-MCNC: 71 MG/DL (ref 65–140)
GLUCOSE SERPL-MCNC: 75 MG/DL (ref 65–140)
GLUCOSE SERPL-MCNC: 79 MG/DL (ref 65–140)
GLUCOSE SERPL-MCNC: 99 MG/DL (ref 65–140)
HCO3 BLDCOA-SCNC: 21.3 MMOL/L (ref 17.3–27.3)
O2 CT VFR BLDCOA CALC: 12 ML/DL
OXYHGB MFR BLDCOA: 51.2 %
PCO2 BLDCOA: 44.4 MM[HG] (ref 30–60)
PH BLDCOA: 7.3 [PH] (ref 7.23–7.43)
PO2 BLDCOA: 23 MM HG (ref 5–25)
POTASSIUM SERPL-SCNC: 4.1 MMOL/L (ref 3.5–5.3)
PROT SERPL-MCNC: 6.4 G/DL (ref 6.4–8.4)
PROT UR-MCNC: 6 MG/DL
PROT/CREAT UR: 0.08 MG/G{CREAT} (ref 0–0.1)
SODIUM SERPL-SCNC: 136 MMOL/L (ref 135–147)
TREPONEMA PALLIDUM IGG+IGM AB [PRESENCE] IN SERUM OR PLASMA BY IMMUNOASSAY: NORMAL

## 2023-12-13 PROCEDURE — 82948 REAGENT STRIP/BLOOD GLUCOSE: CPT

## 2023-12-13 PROCEDURE — 82570 ASSAY OF URINE CREATININE: CPT

## 2023-12-13 PROCEDURE — 3E0P7VZ INTRODUCTION OF HORMONE INTO FEMALE REPRODUCTIVE, VIA NATURAL OR ARTIFICIAL OPENING: ICD-10-PCS | Performed by: OBSTETRICS & GYNECOLOGY

## 2023-12-13 PROCEDURE — 82805 BLOOD GASES W/O2 SATURATION: CPT | Performed by: OBSTETRICS & GYNECOLOGY

## 2023-12-13 PROCEDURE — 10907ZC DRAINAGE OF AMNIOTIC FLUID, THERAPEUTIC FROM PRODUCTS OF CONCEPTION, VIA NATURAL OR ARTIFICIAL OPENING: ICD-10-PCS | Performed by: OBSTETRICS & GYNECOLOGY

## 2023-12-13 PROCEDURE — 84156 ASSAY OF PROTEIN URINE: CPT

## 2023-12-13 RX ORDER — LIDOCAINE HYDROCHLORIDE AND EPINEPHRINE 15; 5 MG/ML; UG/ML
INJECTION, SOLUTION EPIDURAL
Status: COMPLETED | OUTPATIENT
Start: 2023-12-13 | End: 2023-12-13

## 2023-12-13 RX ORDER — OXYTOCIN/RINGER'S LACTATE 30/500 ML
1-30 PLASTIC BAG, INJECTION (ML) INTRAVENOUS
Status: DISCONTINUED | OUTPATIENT
Start: 2023-12-13 | End: 2023-12-14

## 2023-12-13 RX ORDER — ROPIVACAINE HYDROCHLORIDE 2 MG/ML
INJECTION, SOLUTION EPIDURAL; INFILTRATION; PERINEURAL CONTINUOUS PRN
Status: DISCONTINUED | OUTPATIENT
Start: 2023-12-13 | End: 2023-12-14 | Stop reason: HOSPADM

## 2023-12-13 RX ADMIN — ONDANSETRON 4 MG: 2 INJECTION INTRAMUSCULAR; INTRAVENOUS at 16:31

## 2023-12-13 RX ADMIN — ROPIVACAINE HYDROCHLORIDE 10 ML/HR: 2 INJECTION EPIDURAL; INFILTRATION; PERINEURAL at 15:30

## 2023-12-13 RX ADMIN — ROPIVACAINE HYDROCHLORIDE: 2 INJECTION, SOLUTION EPIDURAL; INFILTRATION at 15:32

## 2023-12-13 RX ADMIN — SODIUM CHLORIDE 999 ML/HR: 0.9 INJECTION, SOLUTION INTRAVENOUS at 23:05

## 2023-12-13 RX ADMIN — LIDOCAINE HYDROCHLORIDE AND EPINEPHRINE 5 ML: 15; 5 INJECTION, SOLUTION EPIDURAL at 15:27

## 2023-12-13 RX ADMIN — SODIUM CHLORIDE 125 ML/HR: 0.9 INJECTION, SOLUTION INTRAVENOUS at 07:01

## 2023-12-13 RX ADMIN — SODIUM CHLORIDE 999 ML/HR: 0.9 INJECTION, SOLUTION INTRAVENOUS at 14:57

## 2023-12-13 RX ADMIN — SODIUM CHLORIDE 125 ML/HR: 0.9 INJECTION, SOLUTION INTRAVENOUS at 05:14

## 2023-12-13 RX ADMIN — SODIUM CHLORIDE 125 ML/HR: 0.9 INJECTION, SOLUTION INTRAVENOUS at 18:29

## 2023-12-13 RX ADMIN — Medication 2 MILLI-UNITS/MIN: at 05:21

## 2023-12-13 RX ADMIN — Medication 50 MCG: at 00:11

## 2023-12-13 NOTE — ASSESSMENT & PLAN NOTE
Admit to OBGYN for eIOL  Clear liquid diet   F/u T&S, CBC, RPR   IVF LR 125cc/hr   Continuous fetal monitoring and tocometry   Analgesia at maternal request   Vertex by TAUS  Induction plan: cytotec, FB, and kiko  IOL consent signed on admission

## 2023-12-13 NOTE — ANESTHESIA PREPROCEDURE EVALUATION
Procedure:  LABOR ANALGESIA    Relevant Problems   GYN   (+) 39 weeks gestation of pregnancy      Other   (+) Excessive weight gain during pregnancy in third trimester        Physical Exam    Airway    Mallampati score: II  TM Distance: >3 FB  Neck ROM: full     Dental       Cardiovascular  Rhythm: regular, Rate: normal, Cardiovascular exam normal    Pulmonary  Pulmonary exam normal Breath sounds clear to auscultation    Other Findings  post-pubertal.      Anesthesia Plan  ASA Score- 2     Anesthesia Type- epidural with ASA Monitors. Additional Monitors:     Airway Plan:            Plan Factors-Exercise tolerance (METS): >4 METS. Chart reviewed. Existing labs reviewed. Patient is not a current smoker. Induction-     Postoperative Plan-     Informed Consent- Anesthetic plan and risks discussed with patient.

## 2023-12-13 NOTE — OB LABOR/OXYTOCIN SAFETY PROGRESS
Oxytocin Safety Progress Check Note - Lory Abdul 34 y.o. female MRN: [id number]    Unit/Bed#: L&D 323-01 Encounter: [id number]    Dose (eun-units/min) Oxytocin: 6 eun-units/min  Contraction Frequency (minutes): 2-4  Contraction Intensity: Mild  Uterine Activity Characteristics: Regular  Cervical Dilation: 3        Cervical Effacement: 50  Fetal Station: -3  Baseline Rate (FHR): 125 bpm  Fetal Heart Rate (FHT): 115 BPM (spotty tracing due to mat movement and SVE; off monitor to use BR at 0433 before starting Pitocin)  FHR Category: Cat 1               Vital Signs:   Vitals:    12/13/23 0733   BP: 120/74   Pulse: 67   Resp:    Temp:        Notes/comments:   SVE deferred. FHT Cat 1.  Plan for continued pitocin titration  D/w Dr. Katie Leigh, DO 12/13/2023 7:37 AM

## 2023-12-13 NOTE — OB LABOR/OXYTOCIN SAFETY PROGRESS
Oxytocin Safety Progress Check Note - Fransisco Ganser 34 y.o. female MRN: [id number]    Unit/Bed#: L&D 323-01 Encounter: [id number]    Dose (eun-units/min) Oxytocin: 20 eun-units/min  Contraction Frequency (minutes): 2-4  Contraction Intensity: Mild/Moderate  Uterine Activity Characteristics: Regular  Cervical Dilation: 5-6        Cervical Effacement: 80  Fetal Station: -1  Baseline Rate (FHR): 130 bpm  Fetal Heart Rate (FHT): 130 BPM  FHR Category: 1               Vital Signs:   Vitals:    12/13/23 1637   BP: 132/87   Pulse: 89   Resp:    Temp:      Patient very uncomfortable through contractions despite epidural.  SVE as above. FHT cat 1. Amadou q2-4. Pitocin at 20, will continue to titrate as tolerated. Will call anesthesia to reassess epidural. D/w Dr. Kathleen Larson MD 12/13/2023 4:55 PM

## 2023-12-13 NOTE — OB LABOR/OXYTOCIN SAFETY PROGRESS
Oxytocin Safety Progress Check Note - Natacha Ewing 34 y.o. female MRN: [id number]    Unit/Bed#: L&D 323-01 Encounter: [id number]    Dose (eun-units/min) Oxytocin: 18 eun-units/min  Contraction Frequency (minutes): 2-3  Contraction Intensity: Mild/Moderate  Uterine Activity Characteristics: Regular  Cervical Dilation: 4        Cervical Effacement: 70  Fetal Station: -1  Baseline Rate (FHR): 135 bpm  Fetal Heart Rate (FHT): 135 BPM  FHR Category: 1               Vital Signs:   Vitals:    12/13/23 1527   BP: 128/86   Pulse: 62   Resp:    Temp:      Patient comfortable with epidural.  SVE as above. FHT cat 1. Amadou q2-3. Pitocin at 18, will continue to titrate as tolerated. D/w Dr. Heather Coe.          Jose L Degroot MD 12/13/2023 3:45 PM

## 2023-12-13 NOTE — OB LABOR/OXYTOCIN SAFETY PROGRESS
Labor Progress Note - Jeannette Nicholson 34 y.o. female MRN: [id number]    Unit/Bed#: L&D 323-01 Encounter: [id number]       Contraction Frequency (minutes): 2-5 (pt on her side, ctx inverted)  Contraction Intensity: Mild  Uterine Activity Characteristics: Irritability  Cervical Dilation: 3        Cervical Effacement: 50  Fetal Station: -3  Baseline Rate (FHR): 115 bpm  Fetal Heart Rate (FHT): 115 BPM  FHR Category: Cat 1               Vital Signs:   Vitals:    12/13/23 0155   BP: 102/55   Pulse: 68   Resp:    Temp:        Notes/comments:   SVE as above. Has been 4 hours since cytotec placement. Given change in dilated, mc balloon deferred. Plan to start pitocin titration. Pit order and bundle completed.    Dr. Cynthia Chu aware     [address] [place] [address], DO 12/13/2023 4:52 AM

## 2023-12-13 NOTE — H&P
[address] 34 y.o. female MRN: [id number]  Unit/Bed#: L&D 323-01 Encounter: [id number]    Assessment: 34 y.o.  at 39w2d admitted for IOL. SVE: 1.5/50/-3  FHT: Category 1  Clinical EFW: 80%tile on ultrasound  ; Cephalic confirmed by TAUS  GBS status: Negative   Postpartum contraception plan: outpatient Paraguard    Plan:   Diet controlled gestational diabetes mellitus (GDM) in third trimester  Assessment & Plan  No results found for: "HGBA1C"    Recent Labs     23  2138   POCGLU 64*       Blood Sugar Average: Last 72 hrs:  (P) 64  A1GDM POCT in labor      Uterine fibroid during pregnancy, antepartum  Assessment & Plan  Per most recent MFM U/S:  Intramural myometrium fibroid located in the anterior corpus region, measuring 3.7 x 1.6 x 2.7cm with a volume of 8.5cc    Rubella non-immune status, antepartum  Assessment & Plan  Rubella equivocal  Offer MMR postpartum    Nonimmune to hepatitis B virus  Assessment & Plan  Hep B NI, offer vaccine postpartum    * 39 weeks gestation of pregnancy  Assessment & Plan  Admit to OBGYN for eIOL  Clear liquid diet   F/u T&S, CBC, RPR   IVF LR 125cc/hr   Continuous fetal monitoring and tocometry   Analgesia at maternal request   Vertex by TAUS  Induction plan: cytotec, FB, and pitcoin  IOL consent signed on admission        Discussed case and plan w/ Dr. Yessy Chowdhury      Chief Complaint: Here for induction    HPI: Reece Morejon is a 34 y.o. Janna Bail with an SHAHID of 2023, by Ultrasound at 39w2d who is being admitted for IOL. She denies having uterine contractions, has no LOF, and reports no VB. She states she has felt good FM.     Patient Active Problem List   Diagnosis    Seasonal allergies    39 weeks gestation of pregnancy    Obesity in pregnancy    Nonimmune to hepatitis B virus    Rubella non-immune status, antepartum    Subchorionic hematoma in first trimester    Uterine fibroid during pregnancy, antepartum    Excessive weight gain during pregnancy in third trimester    Lactating mother    Diet controlled gestational diabetes mellitus (GDM) in third trimester     Baby complications/comments: none    Review of Systems   Constitutional:  Negative for chills, fatigue and fever. HENT:  Negative for congestion, hearing loss, rhinorrhea and sore throat. Eyes:  Negative for visual disturbance. Respiratory:  Negative for cough and shortness of breath. Cardiovascular:  Negative for chest pain and palpitations. Gastrointestinal:  Negative for abdominal pain, blood in stool, constipation, diarrhea, nausea and vomiting. Genitourinary:  Negative for dysuria, hematuria and menstrual problem. Skin:  Negative for rash. Neurological:  Negative for dizziness, light-headedness, numbness and headaches.         OB Hx:  OB History    Para Term  AB Living   2 1 1 0 0 1   SAB IAB Ectopic Multiple Live Births   0 0 0 0 1      # Outcome Date GA Lbr Alexi/2nd Weight Sex Delivery Anes PTL Lv   2 Current            1 Term 10/11/20 39w4d / 03:12 3420 g (7 lb 8.6 oz) M Vag-Spont EPI  EMMETT       Past Medical Hx:  Past Medical History:   Diagnosis Date    Coronavirus infection     tested positive 20, never had fever, cleared via telemedicine    COVID-19 virus infection 2020    History of PCOS     getting periods regularly now    Obesity, Class II, BMI 35-39.9     Polycystic ovary syndrome     Varicella vaccine        Past Surgical hx:  Past Surgical History:   Procedure Laterality Date    EAR TUBE REMOVAL      WISDOM TOOTH EXTRACTION         Social Hx:  Alcohol use: denies  Tobacco use: former smoker  Other substance use: denies    Other:     No Known Allergies    Medications Prior to Admission   Medication    Prenatal Vit-Fe Fum-FA-Omega (Prenatal Multi +DHA) 27-0.8-228 MG CAPS    Blood Glucose Monitoring Suppl (OneTouch Verio Flex System) w/Device KIT    Lancets (OneTouch Delica Plus [id number]) MISC    OneTouch Verio test strip       Objective:  Temp:  [98.7 °F (37.1 °C)] 98.7 °F (37.1 °C)  HR:  [83] 83  Resp:  [18] 18  BP: (129)/(79) 129/79  Body mass index is 38.61 kg/m². Physical Exam:  Physical Exam  Constitutional:       General: She is not in acute distress. Appearance: She is not ill-appearing, toxic-appearing or diaphoretic. HENT:      Head: Normocephalic and atraumatic. Nose: Nose normal. No congestion or rhinorrhea. Eyes:      General: No scleral icterus. Right eye: No discharge. Left eye: No discharge. Conjunctiva/sclera: Conjunctivae normal.   Neck:      Vascular: No carotid bruit. Cardiovascular:      Rate and Rhythm: Normal rate and regular rhythm. Pulses: Normal pulses. Heart sounds: Normal heart sounds. No murmur heard. No friction rub. No gallop. Pulmonary:      Effort: Pulmonary effort is normal. No respiratory distress. Breath sounds: Normal breath sounds. No stridor. No wheezing, rhonchi or rales. Chest:      Chest wall: No tenderness. Abdominal:      General: There is no distension. Palpations: Abdomen is soft. There is no mass. Tenderness: There is no abdominal tenderness. There is no guarding or rebound. Hernia: No hernia is present. Musculoskeletal:      Cervical back: Normal range of motion and neck supple. No rigidity or tenderness. Right lower leg: No edema. Left lower leg: No edema. Lymphadenopathy:      Cervical: No cervical adenopathy. Neurological:      Mental Status: She is alert. Skin:     General: Skin is warm and dry. Psychiatric:         Mood and Affect: Mood normal.         Behavior: Behavior normal.   Vitals reviewed.             FHT (on admission): baseline HR 150s, moderate variability, accelerations present, no decelerations  Baseline Rate (FHR): 140 bpm  Variability: Moderate 6-25 BPM  Accelerations: 15 x 15 or greater  Decelerations: None    TOCO (on admission): no contractions   Contraction Frequency (minutes): occasional  Contraction Duration (seconds): 50-60  Contraction Intensity: Mild    Lab Results   Component Value Date    WBC 10.21 (H) 12/12/2023    HGB 11.8 12/12/2023    HCT 36.1 12/12/2023     12/12/2023     Lab Results   Component Value Date    K 3.8 10/09/2020     10/09/2020    CO2 23 10/09/2020    BUN 9 10/09/2020    CREATININE 0.83 10/09/2020    AST 30 10/09/2020    ALT 57 10/09/2020     Prenatal Labs: Reviewed      Blood type: O positive  Antibody: negative  GBS: negative  HIV: non-reactive  Rubella: non-immune  Syphilis IgM/IgG: non-reactive  HBsAg: non-reactive  HBsAb: non-immune  HCAb: non-reactive  Chlamydia: negative  Gonorrhea: negative  Diabetes 1 hour screen: 102 on 6/2/2023; 193 on 9/23/2023  3 hour glucose: not performed  Platelets: 267 (9/82/2791)  Hgb: 11.7 (9/23/2023)  >2 Midnights  INPATIENT     Signature/Title: Abe Harris DO  Date: 12/13/2023  Time: 2:21 AM

## 2023-12-13 NOTE — ASSESSMENT & PLAN NOTE
Per most recent MFM U/S:  Intramural myometrium fibroid located in the anterior corpus region, measuring 3.7 x 1.6 x 2.7cm with a volume of 8.5cc

## 2023-12-13 NOTE — OB LABOR/OXYTOCIN SAFETY PROGRESS
Oxytocin Safety Progress Check Note - Jeannette Nicholson 34 y.o. female MRN: [id number]    Unit/Bed#: L&D 323-01 Encounter: [id number]    Dose (eun-units/min) Oxytocin: 18 eun-units/min  Contraction Frequency (minutes): 2-3  Contraction Intensity: Mild  Uterine Activity Characteristics: Regular  Cervical Dilation: 4        Cervical Effacement: 70  Fetal Station: -1  Baseline Rate (FHR): 150 bpm  Fetal Heart Rate (FHT): 150 BPM  FHR Category: 2               Vital Signs:   Vitals:    12/13/23 1317   BP: 133/88   Pulse: 71   Resp:    Temp:        Patient comfortable in bed. SVE as above. FHT cat 2 for occasional variable decelerations. Amadou q2-3, mild discomfort. Pitocin at 18, will continue to titrate as tolerated. Amniotomy performed for clear fluid. D/w Dr. Mary Vera.        Elisabeth Marin MD 12/13/2023 1:45 PM

## 2023-12-13 NOTE — ASSESSMENT & PLAN NOTE
No results found for: "HGBA1C"    Recent Labs     12/13/23  1132 12/13/23  1535 12/13/23  1932 12/13/23  2115   POCGLU 59* 71 101 99       Blood Sugar Average: Last 72 hrs:  (P) 77.375  Plan for 2 hr GTT 6w pp

## 2023-12-13 NOTE — OB LABOR/OXYTOCIN SAFETY PROGRESS
Oxytocin Safety Progress Check Note - Rosa Kern 34 y.o. female MRN: [id number]    Unit/Bed#: L&D 323-01 Encounter: [id number]    Dose (eun-units/min) Oxytocin: 14 eun-units/min  Contraction Frequency (minutes): 1.5-3  Contraction Intensity: Mild  Uterine Activity Characteristics: Regular  Cervical Dilation: 4        Cervical Effacement: 70  Fetal Station: -1  Baseline Rate (FHR): 130 bpm  Fetal Heart Rate (FHT): 115 BPM (spotty tracing due to mat movement and SVE; off monitor to use BR at 0433 before starting Pitocin)  FHR Category:  I               Vital Signs:   Vitals:    12/13/23 1003   BP: 92/50   Pulse: 78   Resp:    Temp:        Notes/comments:         Felipe Joseph MD 12/13/2023 10:10 AM

## 2023-12-13 NOTE — ANESTHESIA PROCEDURE NOTES
Epidural Block    Patient location during procedure: OB/L&D  Start time: 12/13/2023 3:24 PM  Reason for block: procedure for pain  Staffing  Performed by: Farida Renteria DO  Authorized by: Yun Jackson DO    Preanesthetic Checklist  Completed: patient identified, IV checked, site marked, risks and benefits discussed, surgical consent, monitors and equipment checked, pre-op evaluation and timeout performed  Epidural  Patient position: sitting  Prep: ChloraPrep  Sedation Level: no sedation  Patient monitoring: frequent blood pressure checks, continuous pulse oximetry and heart rate  Approach: midline  Location: lumbar, L3-4  Injection technique: ELLEN saline  Needle  Needle type: Tuohy   Needle gauge: 18 G  Needle insertion depth: 11 cm  Catheter type: multi-orifice  Catheter size: 20 G  Catheter at skin depth: 6 cm  Catheter securement method: stabilization device, clear occlusive dressing and tape  Test dose: negativelidocaine-epinephrine (XYLOCAINE-MPF/EPINEPHRINE) 1.5 %-1:200,000 injection 3 mL - Epidural   5 mL - 12/13/2023 3:27:00 PM  Assessment  Sensory level: T10  Number of attempts: 1negative aspiration for CSF, negative aspiration for heme and no paresthesia on injection  patient tolerated the procedure well with no immediate complications

## 2023-12-14 DIAGNOSIS — Z86.32 HISTORY OF GESTATIONAL DIABETES: ICD-10-CM

## 2023-12-14 DIAGNOSIS — Z13.1 DIABETES MELLITUS SCREENING: Primary | ICD-10-CM

## 2023-12-14 PROBLEM — O13.9 GESTATIONAL HYPERTENSION: Status: ACTIVE | Noted: 2023-12-14

## 2023-12-14 PROBLEM — R03.0 ELEVATED BP WITHOUT DIAGNOSIS OF HYPERTENSION: Status: ACTIVE | Noted: 2023-12-14

## 2023-12-14 LAB
BASE EXCESS BLDCOV CALC-SCNC: -5.9 MMOL/L (ref 1–9)
HCO3 BLDCOV-SCNC: 18.4 MMOL/L (ref 12.2–28.6)
OXYHGB MFR BLDCOV: 79.8 %
PCO2 BLDCOV: 33.5 MM HG (ref 27–43)
PH BLDCOV: 7.36 [PH] (ref 7.19–7.49)
PO2 BLDCOV: 35.3 MM HG (ref 15–45)
SAO2 % BLDCOV: 18.4 ML/DL

## 2023-12-14 PROCEDURE — 99024 POSTOP FOLLOW-UP VISIT: CPT | Performed by: OBSTETRICS & GYNECOLOGY

## 2023-12-14 PROCEDURE — 59400 OBSTETRICAL CARE: CPT | Performed by: OBSTETRICS & GYNECOLOGY

## 2023-12-14 RX ORDER — DOCUSATE SODIUM 100 MG/1
100 CAPSULE, LIQUID FILLED ORAL 2 TIMES DAILY
Status: DISCONTINUED | OUTPATIENT
Start: 2023-12-14 | End: 2023-12-15 | Stop reason: HOSPADM

## 2023-12-14 RX ORDER — BENZOCAINE/MENTHOL 6 MG-10 MG
1 LOZENGE MUCOUS MEMBRANE DAILY PRN
Status: DISCONTINUED | OUTPATIENT
Start: 2023-12-14 | End: 2023-12-15 | Stop reason: HOSPADM

## 2023-12-14 RX ORDER — ACETAMINOPHEN 325 MG/1
650 TABLET ORAL EVERY 6 HOURS PRN
Status: DISCONTINUED | OUTPATIENT
Start: 2023-12-14 | End: 2023-12-15 | Stop reason: HOSPADM

## 2023-12-14 RX ORDER — OXYTOCIN/RINGER'S LACTATE 30/500 ML
250 PLASTIC BAG, INJECTION (ML) INTRAVENOUS ONCE
Status: DISCONTINUED | OUTPATIENT
Start: 2023-12-14 | End: 2023-12-15 | Stop reason: HOSPADM

## 2023-12-14 RX ORDER — ONDANSETRON 2 MG/ML
4 INJECTION INTRAMUSCULAR; INTRAVENOUS EVERY 8 HOURS PRN
Status: DISCONTINUED | OUTPATIENT
Start: 2023-12-14 | End: 2023-12-15 | Stop reason: HOSPADM

## 2023-12-14 RX ORDER — IBUPROFEN 600 MG/1
600 TABLET ORAL EVERY 6 HOURS
Status: DISCONTINUED | OUTPATIENT
Start: 2023-12-14 | End: 2023-12-15 | Stop reason: HOSPADM

## 2023-12-14 RX ORDER — CALCIUM CARBONATE 500 MG/1
1000 TABLET, CHEWABLE ORAL DAILY PRN
Status: DISCONTINUED | OUTPATIENT
Start: 2023-12-14 | End: 2023-12-15 | Stop reason: HOSPADM

## 2023-12-14 RX ORDER — OXYTOCIN/RINGER'S LACTATE 30/500 ML
62.5 PLASTIC BAG, INJECTION (ML) INTRAVENOUS CONTINUOUS
Status: ACTIVE | OUTPATIENT
Start: 2023-12-14 | End: 2023-12-14

## 2023-12-14 RX ORDER — DIPHENHYDRAMINE HYDROCHLORIDE 50 MG/ML
25 INJECTION INTRAMUSCULAR; INTRAVENOUS EVERY 6 HOURS PRN
Status: DISCONTINUED | OUTPATIENT
Start: 2023-12-14 | End: 2023-12-15 | Stop reason: HOSPADM

## 2023-12-14 RX ADMIN — DOCUSATE SODIUM 100 MG: 100 CAPSULE, LIQUID FILLED ORAL at 17:48

## 2023-12-14 RX ADMIN — IBUPROFEN 600 MG: 600 TABLET, FILM COATED ORAL at 03:01

## 2023-12-14 RX ADMIN — Medication 62.5 MILLI-UNITS/MIN: at 00:10

## 2023-12-14 RX ADMIN — DOCUSATE SODIUM 100 MG: 100 CAPSULE, LIQUID FILLED ORAL at 08:16

## 2023-12-14 RX ADMIN — IBUPROFEN 600 MG: 600 TABLET, FILM COATED ORAL at 08:16

## 2023-12-14 RX ADMIN — IBUPROFEN 600 MG: 600 TABLET, FILM COATED ORAL at 17:47

## 2023-12-14 RX ADMIN — BENZOCAINE AND LEVOMENTHOL 1 APPLICATION: 200; 5 SPRAY TOPICAL at 02:07

## 2023-12-14 NOTE — LACTATION NOTE
This note was copied from a baby's chart. Met with parents to discuss feeding plan. Mother discussed that she is breastfeeding and that baby has been sleepy, but is latching well when he feedings directly. The Ready, Set, Baby Booklet was discussed. Discussed importance of skin to skin to help baby awaken for breastfeeding, to help with milk production as well as stabilize temperature, blood sugars, decrease pain, promote relaxation, and calm the baby as well as for bonding that father may do as well. Discussed tummy size progression as milk production increases to meet the nutritional/growing needs of the baby. Discussed alternative feeding methods as a manner to provide baby with additional colostrum/breast milk if baby is sleepy and/or unable to breastfeed directly to help protect the milk supply and preserve latching abilities at the breast. Mother was encouraged to hand express after feedings to provide "dessert" and when sleepy to hand express to provide "snacks" which could help baby to awaken for a feeding. Discussed “Second Night Syndrome” explaining how baby’s cluster feeds to meet growing needs. Growth spurts periods were discussed within the first year and how cluster feeding helps boost milk supply. Explained feeding cues and fullness cues as well as importance of obtaining a deep latch for effective milk removal and proper positioning (tummy to tummy, at level, nose to nipple, bring chin to breast first and bringing baby to breast) with ear, shoulder, and hip alignment. Demonstrated how to hold, compress and perform hand expression. Addressed breast pump needs and mother discussed that she has a double breast pump for home use. Parents were made aware of how to communicate with lactation and encouraged to reach out for a latch assessment, continued support and/or questions that arise.

## 2023-12-14 NOTE — LACTATION NOTE
This note was copied from a baby's chart. CONSULT - LACTATION  Baby Boy Nechama Kussmaul) Trinkle 1 days male MRN: [id number]    [address] [place] [address] NURSERY Room / Bed: L&D 307(N)/L&D 307(N) Encounter: [id number]    Maternal Information     MOTHER:  Natacha Ewing  Maternal Age: 34 y.o.   OB History: # 1 - Date: 10/11/20, Sex: Male, Weight: 3420 g (7 lb 8.6 oz), GA: 39w4d, Delivery: Vaginal, Spontaneous, Apgar1: 9, Apgar5: 9, Living: Living, Birth Comments: None    # 2 - Date: 23, Sex: Male, Weight: 3390 g (7 lb 7.6 oz), GA: 39w3d, Delivery: Vaginal, Spontaneous, Apgar1: 9, Apgar5: 9, Living: Living, Birth Comments: None   Previouse breast reduction surgery? No    Lactation history:   Has patient previously breast fed: Yes   How long had patient previously breast fed: Attempted, did not last long, a few days   Previous breast feeding complications:  (Clavicle fracture)     Past Surgical History:   Procedure Laterality Date    EAR TUBE REMOVAL      WISDOM TOOTH EXTRACTION          Birth information:  YOB: 2021   Time of birth: 11:51 PM   Sex: male   Delivery type: Vaginal, Spontaneous   Birth Weight: 3390 g (7 lb 7.6 oz)   Percent of Weight Change: 0%     Gestational Age: 43w2d   [unfilled]    Assessment     Breast and nipple assessment:  triangular shape, with soft tissue, everted nipple, symmetrical, with minimal spacing noted between breasts.     [place] Assessment: sleepy    Feeding assessment:  baby was provided with 20 drops of colostrum via finger feeding that helped awaken   LATCH:  Latch: Grasps breast, tongue down, lips flanged, rhythmic sucking   Audible Swallowing: A few with stimulation   Type of Nipple: Everted (After stimulation)   Comfort (Breast/Nipple): Soft/non-tender   Hold (Positioning): Partial assist, teach one side, mother does other, staff holds   DEPL CENTER Score: 8          Feeding recommendations:   encourage baby to awaken every 2-3 hours for the first day of life, if sleepy, hand express and provide colostrum via finger feeding both sides  (20-30 drops each side) and use breast compression during feedings to help with flow and keep awake.     Noemi Mejias RN 12/14/2023 3:10 PM

## 2023-12-14 NOTE — OB LABOR/OXYTOCIN SAFETY PROGRESS
Oxytocin Safety Progress Check Note - Caitlyn Vega 34 y.o. female MRN: [id number]    Unit/Bed#: L&D 323-01 Encounter: [id number]    Dose (eun-units/min) Oxytocin: 30 eun-units/min  Contraction Frequency (minutes): 1-4 (difficult to  ctx, adjusted)  Contraction Intensity: Mild/Moderate  Uterine Activity Characteristics: Regular  Cervical Dilation: 10  Dilation Complete Date: 12/13/23  Dilation Complete Time: 2330  Cervical Effacement: 100  Fetal Station: 2  Baseline Rate (FHR): 160 bpm  Fetal Heart Rate (FHT): 130 BPM  FHR Category: Cat 1               Vital Signs:   Vitals:    12/13/23 2322   BP: 106/59   Pulse: 96   Resp:    Temp:        Notes/comments:   SVE as above. Pt comfortable with epidural. Feeling intermittent contractions. Plan to start pushing.  Dr Loyd Hsu aware     Melanie Sewell, DO 12/13/2023 11:37 PM

## 2023-12-14 NOTE — DISCHARGE SUMMARY
Discharge Summary - Jesús Arguello 34 y.o. female MRN: [id number]    Unit/Bed#: L&D 307-01 Encounter: [id number]    ADMISSION  Admission Date: 2023   Admitting Attending: Dr. Resham Ha  Admitting Diagnoses:   Patient Active Problem List   Diagnosis    Seasonal allergies    39 weeks gestation of pregnancy    Obesity in pregnancy    Nonimmune to hepatitis B virus    Rubella non-immune status, antepartum    Subchorionic hematoma in first trimester    Excessive weight gain during pregnancy in third trimester    Lactating mother    Diet controlled gestational diabetes mellitus (GDM) in third trimester     (spontaneous vaginal delivery)    Gestational hypertension       DELIVERY  Delivery Method: Vaginal, Spontaneous    Delivery Date and Time: 2023  11:51 PM   Delivery Attending: Dr. Cameron Hartman  Discharge Date: 12/15/23   Discharge Attending: Dr. Stanley Miller  Discharge Diagnosis:   Same, Delivered  Gestational hypertension  Clinical course: Admission to Delivery  Jesús Arguello is a 34 y.o.  female at 43w3d who was admitted to L&D for IOL for A1GDM. Her initial cervical exam was 1.5/50/-3. She received a dose of oral Cytotec. She made change to 3/50/-3 and was started on Pitocin titration. She later had artificial rupture membranes with clear fluid. She received an epidural for analgesia. During her labor course she met criteria for gestational hypertension after having several elevated blood pressures more than 4 hours apart. She later progressed to complete cervical dilation and began pushing       Delivery  Route of Delivery: Vaginal, Spontaneous     Anesthesia: Epidural ,   QBL: Non-Surgical QBL (mL): 115        Delivery: Vaginal, Spontaneous  at 2023  11:51 PM   Laceration: R periurethral. Did not require repair    Baby's Weight: 3390 g (7 lb 7.6 oz) ; 119.58     Apgar scores: 9  and 9  at 1 and 5 minutes, respectively      Clinical Course: Post-Delivery:   The post delivery course was notable for gestational hypertension. She remained normotensive and did not require treatment with medication or magnesium    On the day of discharge, the patient was ambulating, voiding spontaneously, tolerating oral intake, and hemodynamically stable. She was able to reasonably perform all ADLs. She had appropriate bowel function. Pain was well-controlled. She was discharged home on postpartum/postop day #2 without complications. Patient was instructed to follow up with her OB as an outpatient and was given appropriate warnings to call her provider with problems or concerns. Pertinent lab findings included:   Blood type O positive.      Last three Hgb values:  Lab Results   Component Value Date    HGB 11.8 2023    HGB 11.7 2023    HGB 12.1 2023        Problem-specific follow-up plans included the following:  Problem List       Seasonal allergies    39 weeks gestation of pregnancy    Obesity in pregnancy    Nonimmune to hepatitis B virus    Current Assessment & Plan     Hep B NI, plan to offer vaccine postpartum         Rubella non-immune status, antepartum    Overview     Equivocal         Current Assessment & Plan     Rubella equivocal  Interested in MMR postpartum         Subchorionic hematoma in first trimester    Excessive weight gain during pregnancy in third trimester    Lactating mother    Diet controlled gestational diabetes mellitus (GDM) in third trimester    Current Assessment & Plan     No results found for: "HGBA1C"    Recent Labs     23  1132 23  1535 23  1932 23  2115   POCGLU 59* 71 101 99       Blood Sugar Average: Last 72 hrs:  (P) 77.375  Plan for 2 hr GTT 6w pp           * (Principal)  (spontaneous vaginal delivery)    Current Assessment & Plan     Routine postpartum care  Encourage ambulation  Encourage familial bonding  Lactation support as needed  Pain: Motrin/Tylenol   Postpartum Contraceptive plan: IUD 6 weeks postpartum  Voiding spontaneously  DVT Ppx: ambulation  She would like to go home today         Gestational hypertension    Current Assessment & Plan     Dx intrapartum elevated BP > 4 hours  No other elevated BP in pregnancy  Asymptomatic    Systolic (09ZNU), UXO:892 , Min:107 , RJI:181   Diastolic (88VLS), KLY:61, Min:55, Max:78    Results from last 7 days   Lab Units 12/12/23  2150   PLATELETS Thousands/uL 175     Results from last 7 days   Lab Units 12/12/23  2150   BUN mg/dL 13   CREATININE mg/dL 0.52*   EGFR ml/min/1.73sq m 132     Results from last 7 days   Lab Units 12/12/23  2150   AST U/L 15   ALT U/L 13   ALK PHOS U/L 141*   TOTAL PROTEIN g/dL 6.4   ALBUMIN g/dL 3.8   TOTAL BILIRUBIN mg/dL 0.27     PC ratio 0.08               Discharge med list:  Contraception: IUD 6 weeks postpartum     Medication List      CONTINUE taking these medications     Prenatal Multi +DHA 27-0.8-228 MG Caps;  Take 1 capsule by mouth daily       Condition at discharge:   stable     Disposition:   Home    Planned Readmission:   No      Wilmar You MD   PGY-I, OBGYN  12/15/2023  6:54 AM

## 2023-12-14 NOTE — L&D DELIVERY NOTE
DELIVERY NOTE  Reece Morejon 34 y.o. female MRN: [id number]  Unit/Bed#: L&D 323-01 Encounter: [id number]    Obstetrician:    Dr. Molly García MD    Assistant:   Dr. Kirstin Fisher, DO PGY-2   Dr. Lewis Stevens, DO PGY-1    Pre-Delivery Diagnosis:   [email or web address]  GBS negative  Rh positive  Intrauterine fibroid  A1GDM  Rubella equivocal  Hep B non-immune    Post-Delivery Diagnosis:   Same as above - Delivered  Periurethral tear  Gestational Hypertension    Procedure:  Spontaneous vaginal delivery      Anesthesia:  epidural    Specimens:   Cord blood obtained   Placenta; normal appearing, central insertion, intact   Arterial and venous blood gases (below)     Gases:  Umbilical Cord Venous Blood Gas:  Results from last 7 days   Lab Units 12/13/23  2353   PH COV  7.358   PCO2 COV mm HG 33.5   HCO3 COV mmol/L 18.4   BASE EXC COV mmol/L -5.9*   O2 CT CD VB mL/dL 18.4   O2 HGB, VENOUS CORD % 66.3     Umbilical Cord Arterial Blood Gas:  Results from last 7 days   Lab Units 12/13/23  2353   PH COA  7.299   PCO2 COA  44.4   PO2 COA mm HG 23.0   HCO3 COA mmol/L 21.3   BASE EXC COA mmol/L -5.1*   O2 CONTENT CORD ART ml/dl 12.0   O2 HGB, ARTERIAL CORD % 51.2       Quantitative Blood Loss:   115 mL           Complications:    None apparent    Brief Description of Labor Course:  Reece Morejon is a 34 y.o. Janna Bail female at 39w4d who was admitted to L&D for IOL for A1GDM. Her initial cervical exam was 1.5/50/-3. She received a dose of oral Cytotec. She made change to 3/50/-3 and was started on Pitocin titration. She later had artificial rupture membranes with clear fluid. She received an epidural for analgesia. During her labor course she met criteria for gestational hypertension after having several elevated blood pressures more than 4 hours apart.   She later progressed to complete cervical dilation and began pushing    Description of Delivery:   With  the assistance of maternal expulsive forces, the fetal vertex delivered spontaneously. A nuchal cord was noted and reduced manually. The anterior right shoulder was delivered atraumatically with gentle downward traction. The contralateral arm was delivered with gentle upward traction. The remainder of the fetus delivered spontaneously at 46, resulting in a viable male . Upon delivery, the infant was placed on the mothers abdomen and the cord was doubly clamped and cut after 30 seconds. The  was noted to have good tone and cry spontaneously. There was no evidence of injury. The  was examined by nursing at bedside. Umbilical cord blood and umbilical artery and venous gases were collected and sent to the lab. An intact placenta was delivered spontaneously at 2356 using fundal massage and gentle cord traction and was noted to have a centrally-inserted 3-vessel cord. Active management of the third stage of labor was undertaken with IV pitocin at 250 milliunits/min. Some brisk bleeding was noted after delivery of the placenta. A bimanual exam was performed and moderate amount of clot was removed from the lower uterine segment. Pitocin was increased to a rate of 999 milliunits/min at that time. Noted improvement of tone of the lower uterine segment after bimanual massage and increased rate of Pitocin. Bleeding was noted to be under control. Pitocin was returned to a rate of 250 milliunits/min. Inspection of the perineum, vagina, labia, cervix, and urethra revealed a right periurethral laceration that was hemostatic after applied pressure. Laceration did not require repair     Outcome:  Living  with APGARS 9 (1 min) and 9 (5 min).  weight: pending    At the conclusion of the delivery, all needle, sponge, and instrument counts were noted to be correct. Patient tolerated the procedure well and was allowed to recover in labor and delivery room with family and  before being transferred to the post-partum floor. Conclusion:  Mother and baby are currently recovering nicely in stable condition. Attending Supervision:   Dr. Marcello Boles MD was present for the entire procedure.     Andreina Gill, DO  OB/GYN PGY-2  12/14/2023 12:09 AM

## 2023-12-14 NOTE — ASSESSMENT & PLAN NOTE
Routine postpartum care  Encourage ambulation  Encourage familial bonding  Lactation support as needed  Pain: Motrin/Tylenol   Postpartum Contraceptive plan: IUD 6 weeks postpartum  Voiding spontaneously  DVT Ppx: ambulation  She would like to go home today

## 2023-12-14 NOTE — ASSESSMENT & PLAN NOTE
Dx intrapartum elevated BP > 4 hours  No other elevated BP in pregnancy  Asymptomatic    Systolic (90XHQ), NWY:949 , Min:107 , OPC:223   Diastolic (48FGD), PAU:05, Min:55, Max:78    Results from last 7 days   Lab Units 12/12/23  2150   PLATELETS Thousands/uL 175     Results from last 7 days   Lab Units 12/12/23  2150   BUN mg/dL 13   CREATININE mg/dL 0.52*   EGFR ml/min/1.73sq m 132     Results from last 7 days   Lab Units 12/12/23  2150   AST U/L 15   ALT U/L 13   ALK PHOS U/L 141*   TOTAL PROTEIN g/dL 6.4   ALBUMIN g/dL 3.8   TOTAL BILIRUBIN mg/dL 0.27     PC ratio 0.08

## 2023-12-14 NOTE — ANESTHESIA POSTPROCEDURE EVALUATION
Post-Op Assessment Note    CV Status:  Stable    Pain management: adequate      Post-op block assessment: catheter intact and no complications   Mental Status:  Alert and awake   Hydration Status:  Euvolemic   PONV Controlled:  Controlled   Airway Patency:  Patent     Post Op Vitals Reviewed: Yes      Staff: Anesthesiologist               BP      Temp      Pulse     Resp      SpO2      /68   Pulse 67   Temp 98.6 °F (37 °C) (Oral)   Resp 16   Ht 5' 5" (1.651 m)   Wt 105 kg (232 lb)   LMP 03/07/2023 (Exact Date)   SpO2 96%   Breastfeeding Unknown   BMI 38.61 kg/m²

## 2023-12-14 NOTE — PLAN OF CARE
Problem: PAIN - ADULT  Goal: Verbalizes/displays adequate comfort level or baseline comfort level  Description: Interventions:  - Encourage patient to monitor pain and request assistance  - Assess pain using appropriate pain scale  - Administer analgesics based on type and severity of pain and evaluate response  - Implement non-pharmacological measures as appropriate and evaluate response  - Consider cultural and social influences on pain and pain management  - Notify physician/advanced practitioner if interventions unsuccessful or patient reports new pain  12/14/2023 0208 by Jeffrey Ferraro RN  Outcome: Progressing  12/14/2023 0208 by Jeffrey Ferraro RN  Outcome: Progressing     Problem: INFECTION - ADULT  Goal: Absence or prevention of progression during hospitalization  Description: INTERVENTIONS:  - Assess and monitor for signs and symptoms of infection  - Monitor lab/diagnostic results  - Monitor all insertion sites, i.e. indwelling lines, tubes, and drains  - Monitor endotracheal if appropriate and nasal secretions for changes in amount and color  - [place] appropriate cooling/warming therapies per order  - Administer medications as ordered  - Instruct and encourage patient and family to use good hand hygiene technique  - Identify and instruct in appropriate isolation precautions for identified infection/condition  12/14/2023 0208 by Jeffrey Ferraro RN  Outcome: Progressing  12/14/2023 0208 by Jeffrey Ferraro RN  Outcome: Progressing  Goal: Absence of fever/infection during neutropenic period  Description: INTERVENTIONS:  - Monitor WBC    12/14/2023 0208 by Jeffrey Ferraro RN  Outcome: Progressing  12/14/2023 0208 by Jeffrey Ferraro RN  Outcome: Progressing     Problem: SAFETY ADULT  Goal: Patient will remain free of falls  Description: INTERVENTIONS:  - Educate patient/family on patient safety including physical limitations  - Instruct patient to call for assistance with activity   - Consult OT/PT to assist with strengthening/mobility   - Keep Call bell within reach  - Keep bed low and locked with side rails adjusted as appropriate  - Keep care items and personal belongings within reach  - Initiate and maintain comfort rounds  - Make Fall Risk Sign visible to staff  - Apply yellow socks and bracelet for high fall risk patients  - Consider moving patient to room near nurses station  12/14/2023 0208 by Tin Palumbo RN  Outcome: Progressing  12/14/2023 0208 by Tin Palumbo RN  Outcome: Progressing  Goal: Maintain or return to baseline ADL function  Description: INTERVENTIONS:  -  Assess patient's ability to carry out ADLs; assess patient's baseline for ADL function and identify physical deficits which impact ability to perform ADLs (bathing, care of mouth/teeth, toileting, grooming, dressing, etc.)  - Assess/evaluate cause of self-care deficits   - Assess range of motion  - Assess patient's mobility; develop plan if impaired  - Assess patient's need for assistive devices and provide as appropriate  - Encourage maximum independence but intervene and supervise when necessary  - Involve family in performance of ADLs  - Assess for home care needs following discharge   - Consider OT consult to assist with ADL evaluation and planning for discharge  - Provide patient education as appropriate  12/14/2023 0208 by Tin Palumbo RN  Outcome: Progressing  12/14/2023 0208 by Tin Palumbo RN  Outcome: Progressing  Goal: Maintains/Returns to pre admission functional level  Description: INTERVENTIONS:  - Perform AM-PAC 6 Click Basic Mobility/ Daily Activity assessment daily.  - Set and communicate daily mobility goal to care team and patient/family/caregiver.    - Collaborate with rehabilitation services on mobility goals if consulted  - Out of bed for toileting  - Record patient progress and toleration of activity level   12/14/2023 0208 by Tin Palumbo RN  Outcome: Progressing  12/14/2023 0208 by Tin Palumbo RN  Outcome: Progressing     Problem: Knowledge Deficit  Goal: Patient/family/caregiver demonstrates understanding of disease process, treatment plan, medications, and discharge instructions  Description: Complete learning assessment and assess knowledge base.   Interventions:  - Provide teaching at level of understanding  - Provide teaching via preferred learning methods  12/14/2023 0208 by Vandana Patel RN  Outcome: Progressing  12/14/2023 0208 by Vandana Patel RN  Outcome: Progressing     Problem: DISCHARGE PLANNING  Goal: Discharge to home or other facility with appropriate resources  Description: INTERVENTIONS:  - Identify barriers to discharge w/patient and caregiver  - Arrange for needed discharge resources and transportation as appropriate  - Identify discharge learning needs (meds, wound care, etc.)  - Arrange for interpretive services to assist at discharge as needed  - Refer to Case Management Department for coordinating discharge planning if the patient needs post-hospital services based on physician/advanced practitioner order or complex needs related to functional status, cognitive ability, or social support system  12/14/2023 0208 by Vandana Patel RN  Outcome: Progressing  12/14/2023 0208 by Vandana Patel RN  Outcome: Progressing

## 2023-12-14 NOTE — PROGRESS NOTES
Obstetrics Progress Note  Kevin Rowe 34 y.o. female MRN: [id number]  Unit/Bed#: L&D 307-01 Encounter: [id number]    Assessment/Plan:  Postpartum Day #1 s/p . Stable. Baby in room. By issue:  *  (spontaneous vaginal delivery)  Assessment & Plan  Routine postpartum care  Encourage ambulation  Encourage familial bonding  Lactation support as needed  Pain: Motrin/Tylenol   Postpartum Contraceptive plan: IUD 6 weeks postpartum  Voiding spontaneously  DVT Ppx: ambulation    Gestational hypertension  Assessment & Plan  Dx intrapartum elevated BP > 4 hours  No other elevated BP in pregnancy  Asymptomatic  Consider sending home with OB PP BP monitoring program    Systolic (49YYM), QPI:108 , Min:90 , ZNZ:133   Diastolic (69JHK), JNO:76, Min:49, Max:91    Results from last 7 days   Lab Units 23  2150   PLATELETS Thousands/uL 175     Results from last 7 days   Lab Units 23  2150   BUN mg/dL 13   CREATININE mg/dL 0.52*   EGFR ml/min/1.73sq m 132     Results from last 7 days   Lab Units 23  2150   AST U/L 15   ALT U/L 13   ALK PHOS U/L 141*   TOTAL PROTEIN g/dL 6.4   ALBUMIN g/dL 3.8   TOTAL BILIRUBIN mg/dL 0.27     PC ratio 0.08      Diet controlled gestational diabetes mellitus (GDM) in third trimester  Assessment & Plan  No results found for: "HGBA1C"    Recent Labs     23  1132 23  1535 23  1932 23  2115   POCGLU 59* 71 101 99       Blood Sugar Average: Last 72 hrs:  (P) 77.375   -2 hr GTT 6w pp      Rubella non-immune status, antepartum  Assessment & Plan  Rubella equivocal  Interested in MMR postpartum    Nonimmune to hepatitis B virus  Assessment & Plan  Hep B NI, plan to offer vaccine postpartum      Anticipate discharge PPD #2. Subjective/Objective   Chief Complaint:   Post delivery     Subjective:   Pain: well controlled. Tolerating PO: yes. Voiding: yes. Flatus: no. Ambulating: yes.  Chest pain: no. Shortness of breath: no. Leg pain: no. Lochia: within normal limits. Infant feeding: breast. Denies headache, vision changes or leg pain. Objective:   Vitals:   Temp:  [97.9 °F (36.6 °C)-99.8 °F (37.7 °C)] 98.2 °F (36.8 °C)  HR:  [] 82  Resp:  [18] 18  BP: ()/(49-91) 118/70       Intake/Output Summary (Last 24 hours) at 12/14/2023 6252  Last data filed at 12/14/2023 0300  Gross per 24 hour   Intake --   Output 865 ml   Net -865 ml       Lab Results   Component Value Date    WBC 10.21 (H) 12/12/2023    HGB 11.8 12/12/2023    HCT 36.1 12/12/2023    MCV 87 12/12/2023     12/12/2023       Physical Exam:   General: alert and oriented x3, in no apparent distress  Cardiovascular: regular rate, warm and well perfused  Pulmonary: normal effort  Abdomen: Soft, non-tender, non-distended, no rebound or guarding.  Uterine fundus firm and non-tender, -2 cm below the umbilicus   Extremities: Non tender    Sasha Almendarez MD  PGY-I, OBGYN  12/14/2023, 6:20 AM

## 2023-12-14 NOTE — UTILIZATION REVIEW
NOTIFICATION OF INPATIENT ADMISSION   MATERNITY/DELIVERY AUTHORIZATION REQUEST   SERVICING FACILITY:   [address] [place] [address] - L&D, [place], NICU  [address]  Tax ID: [id number]  NPI: [id number] ATTENDING PROVIDER:  Attending Name and NPI#: Christine Wright Md [[id number]]  Address: [address]  Phone: [phone]     ADMISSION INFORMATION:  [facility]   Place of Service Code: 21  Inpatient Admission Date/Time: 23  9:39 PM  Discharge Date/Time: No discharge date for patient encounter. Admitting Diagnosis Code/Description:  Encounter for full-term uncomplicated delivery [F61]   Mother: Lory Abdul 1997 Estimated Date of Delivery: 23  Delivering clinician: Christine Wright    OB History          2    Para   2    Term   2       0    AB   0    Living   2         SAB   0    IAB   0    Ectopic   0    Multiple   0    Live Births   2               [place] Name & MRN:   Information for the patient's :  Tin Antunez [[id number]]   [place] Delivery Information:  Sex: male  Delivered 2023 11:51 PM by Vaginal, Spontaneous; Gestational Age: 43w2d     Measurements:  Weight: 7 lb 7.6 oz (3390 g); Height: 19.5"    APGAR 1 minute 5 minutes 10 minutes   Totals: 9 9       Birth Information: 34 y.o. female MRN: [id number] Unit/Bed#: L&D 307-01   Birthweight: No birth weight on file. Gestational Age: <None> Delivery Type:    APGARS Totals:        UTILIZATION REVIEW CONTACT:  Rachel Faith Utilization   Network Utilization Review Department  Phone: [phone]  Fax [phone]  Email: [email or web address]  Contact for approvals/pending authorizations, clinical reviews, and discharge.    PHYSICIAN ADVISORY SERVICES:  Medical Necessity Denial & Ablk-oh-Lzwe Review  Phone: [phone]  Fax: [phone]  Email: [email or web address]   DISCHARGE SUPPORT TEAM:  For Patients Discharge Needs & Updates  Phone: [phone] opt. 2 Fax: [phone]  Email: [email or web address]

## 2023-12-14 NOTE — PLAN OF CARE
Problem: PAIN - ADULT  Goal: Verbalizes/displays adequate comfort level or baseline comfort level  Description: Interventions:  - Encourage patient to monitor pain and request assistance  - Assess pain using appropriate pain scale  - Administer analgesics based on type and severity of pain and evaluate response  - Implement non-pharmacological measures as appropriate and evaluate response  - Consider cultural and social influences on pain and pain management  - Notify physician/advanced practitioner if interventions unsuccessful or patient reports new pain  Outcome: Progressing     Problem: INFECTION - ADULT  Goal: Absence or prevention of progression during hospitalization  Description: INTERVENTIONS:  - Assess and monitor for signs and symptoms of infection  - Monitor lab/diagnostic results  - Monitor all insertion sites, i.e. indwelling lines, tubes, and drains  - Monitor endotracheal if appropriate and nasal secretions for changes in amount and color  - [place] appropriate cooling/warming therapies per order  - Administer medications as ordered  - Instruct and encourage patient and family to use good hand hygiene technique  - Identify and instruct in appropriate isolation precautions for identified infection/condition  Outcome: Progressing  Goal: Absence of fever/infection during neutropenic period  Description: INTERVENTIONS:  - Monitor WBC    Outcome: Progressing     Problem: SAFETY ADULT  Goal: Patient will remain free of falls  Description: INTERVENTIONS:  - Educate patient/family on patient safety including physical limitations  - Instruct patient to call for assistance with activity   - Consult OT/PT to assist with strengthening/mobility   - Keep Call bell within reach  - Keep bed low and locked with side rails adjusted as appropriate  - Keep care items and personal belongings within reach  - Initiate and maintain comfort rounds  - Make Fall Risk Sign visible to staff  - Offer Toileting every  Hours, in advance of need  - Initiate/Maintain alarm  - Obtain necessary fall risk management equipment:   - Apply yellow socks and bracelet for high fall risk patients  - Consider moving patient to room near nurses station  Outcome: Progressing  Goal: Maintain or return to baseline ADL function  Description: INTERVENTIONS:  -  Assess patient's ability to carry out ADLs; assess patient's baseline for ADL function and identify physical deficits which impact ability to perform ADLs (bathing, care of mouth/teeth, toileting, grooming, dressing, etc.)  - Assess/evaluate cause of self-care deficits   - Assess range of motion  - Assess patient's mobility; develop plan if impaired  - Assess patient's need for assistive devices and provide as appropriate  - Encourage maximum independence but intervene and supervise when necessary  - Involve family in performance of ADLs  - Assess for home care needs following discharge   - Consider OT consult to assist with ADL evaluation and planning for discharge  - Provide patient education as appropriate  Outcome: Progressing  Goal: Maintains/Returns to pre admission functional level  Description: INTERVENTIONS:  - Perform AM-PAC 6 Click Basic Mobility/ Daily Activity assessment daily.  - Set and communicate daily mobility goal to care team and patient/family/caregiver. - Collaborate with rehabilitation services on mobility goals if consulted  - Perform Range of Motion  times a day. - Reposition patient every  hours.   - Dangle patient  times a day  - Stand patient  times a day  - Ambulate patient  times a day  - Out of bed to chair  times a day   - Out of bed for meals times a day  - Out of bed for toileting  - Record patient progress and toleration of activity level   Outcome: Progressing     Problem: Knowledge Deficit  Goal: Patient/family/caregiver demonstrates understanding of disease process, treatment plan, medications, and discharge instructions  Description: Complete learning assessment and assess knowledge base.   Interventions:  - Provide teaching at level of understanding  - Provide teaching via preferred learning methods  Outcome: Progressing     Problem: DISCHARGE PLANNING  Goal: Discharge to home or other facility with appropriate resources  Description: INTERVENTIONS:  - Identify barriers to discharge w/patient and caregiver  - Arrange for needed discharge resources and transportation as appropriate  - Identify discharge learning needs (meds, wound care, etc.)  - Arrange for interpretive services to assist at discharge as needed  - Refer to Case Management Department for coordinating discharge planning if the patient needs post-hospital services based on physician/advanced practitioner order or complex needs related to functional status, cognitive ability, or social support system  Outcome: Progressing

## 2023-12-15 VITALS
TEMPERATURE: 97.9 F | HEIGHT: 65 IN | RESPIRATION RATE: 16 BRPM | BODY MASS INDEX: 38.65 KG/M2 | OXYGEN SATURATION: 97 % | SYSTOLIC BLOOD PRESSURE: 127 MMHG | WEIGHT: 232 LBS | DIASTOLIC BLOOD PRESSURE: 65 MMHG | HEART RATE: 62 BPM

## 2023-12-15 PROCEDURE — 99024 POSTOP FOLLOW-UP VISIT: CPT | Performed by: OBSTETRICS & GYNECOLOGY

## 2023-12-15 PROCEDURE — NC001 PR NO CHARGE: Performed by: OBSTETRICS & GYNECOLOGY

## 2023-12-15 RX ORDER — ACETAMINOPHEN 325 MG/1
650 TABLET ORAL EVERY 6 HOURS PRN
Start: 2023-12-15

## 2023-12-15 RX ORDER — IBUPROFEN 600 MG/1
600 TABLET ORAL EVERY 6 HOURS
Start: 2023-12-15

## 2023-12-15 RX ADMIN — DOCUSATE SODIUM 100 MG: 100 CAPSULE, LIQUID FILLED ORAL at 07:23

## 2023-12-15 RX ADMIN — IBUPROFEN 600 MG: 600 TABLET, FILM COATED ORAL at 13:11

## 2023-12-15 RX ADMIN — IBUPROFEN 600 MG: 600 TABLET, FILM COATED ORAL at 07:24

## 2023-12-15 NOTE — PROGRESS NOTES
Obstetrics Progress Note  Anupama Hill 34 y.o. female MRN: [id number]  Unit/Bed#: L&D 307-01 Encounter: [id number]    Assessment/Plan:  Postpartum Day #2 s/p . Stable. Baby in room. By issue:  *  (spontaneous vaginal delivery)  Assessment & Plan  Routine postpartum care  Encourage ambulation  Encourage familial bonding  Lactation support as needed  Pain: Motrin/Tylenol   Postpartum Contraceptive plan: IUD 6 weeks postpartum  Voiding spontaneously  DVT Ppx: ambulation  She would like to go home today    Gestational hypertension  Assessment & Plan  Dx intrapartum elevated BP > 4 hours  No other elevated BP in pregnancy  Asymptomatic    Systolic (92SSZ), OPV:334 , Min:107 , IQK:345   Diastolic (72YMF), JRD:26, Min:55, Max:78    Results from last 7 days   Lab Units 23  2150   PLATELETS Thousands/uL 175     Results from last 7 days   Lab Units 23  2150   BUN mg/dL 13   CREATININE mg/dL 0.52*   EGFR ml/min/1.73sq m 132     Results from last 7 days   Lab Units 23  2150   AST U/L 15   ALT U/L 13   ALK PHOS U/L 141*   TOTAL PROTEIN g/dL 6.4   ALBUMIN g/dL 3.8   TOTAL BILIRUBIN mg/dL 0.27     PC ratio 0.08      Diet controlled gestational diabetes mellitus (GDM) in third trimester  Assessment & Plan  No results found for: "HGBA1C"    Recent Labs     23  1132 23  1535 23  1932 23  2115   POCGLU 59* 71 101 99       Blood Sugar Average: Last 72 hrs:  (P) 77.375  Plan for 2 hr GTT 6w pp      Rubella non-immune status, antepartum  Assessment & Plan  Rubella equivocal  Interested in MMR postpartum    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 Subjective/Objective   Chief Complaint:   Post delivery     Subjective:   Pain: well controlled. Tolerating PO: yes. Voiding: yes. Flatus: no. Ambulating: yes. Chest pain: no. Shortness of breath: no. Leg pain: no. Lochia: within normal limits.  Infant feeding: breast. Denies headache or vision changes. Objective:   Vitals:   Temp:  [97.3 °F (36.3 °C)-98.9 °F (37.2 °C)] 98.9 °F (37.2 °C)  HR:  [67-87] 74  Resp:  [16] 16  BP: (107-123)/(55-78) 107/55       Intake/Output Summary (Last 24 hours) at 12/15/2023 0618  Last data filed at 12/14/2023 0700  Gross per 24 hour   Intake --   Output 450 ml   Net -450 ml       Lab Results   Component Value Date    WBC 10.21 (H) 12/12/2023    HGB 11.8 12/12/2023    HCT 36.1 12/12/2023    MCV 87 12/12/2023     12/12/2023       Physical Exam:   General: alert and oriented x3, in no apparent distress  Cardiovascular: regular rate, warm and well perfused  Pulmonary: normal effort  Abdomen: Soft, non-tender, non-distended, no rebound or guarding.  Uterine fundus firm and non-tender, -2 cm below the umbilicus   Extremities: Non tender    Alverto Atkinson MD  PGY-I, OBGYN  12/15/2023, 6:18 AM

## 2023-12-15 NOTE — NURSING NOTE
Save your life magnet and d/c handouts given and explained to pt. Pt will f/u with any questions or concerns.

## 2023-12-15 NOTE — NURSING NOTE
Discharge paperwork reviewed and signed with patient and FOB. Verbalized understanding with no further questions at this time.

## 2023-12-15 NOTE — PLAN OF CARE
Problem: PAIN - ADULT  Goal: Verbalizes/displays adequate comfort level or baseline comfort level  Description: Interventions:  - Encourage patient to monitor pain and request assistance  - Assess pain using appropriate pain scale  - Administer analgesics based on type and severity of pain and evaluate response  - Implement non-pharmacological measures as appropriate and evaluate response  - Consider cultural and social influences on pain and pain management  - Notify physician/advanced practitioner if interventions unsuccessful or patient reports new pain  12/15/2023 1607 by Rian Pemberton RN  Outcome: Adequate for Discharge  12/15/2023 0927 by Rian Pemberton RN  Outcome: Progressing     Problem: INFECTION - ADULT  Goal: Absence or prevention of progression during hospitalization  Description: INTERVENTIONS:  - Assess and monitor for signs and symptoms of infection  - Monitor lab/diagnostic results  - Monitor all insertion sites, i.e. indwelling lines, tubes, and drains  - Monitor endotracheal if appropriate and nasal secretions for changes in amount and color  - [place] appropriate cooling/warming therapies per order  - Administer medications as ordered  - Instruct and encourage patient and family to use good hand hygiene technique  - Identify and instruct in appropriate isolation precautions for identified infection/condition  12/15/2023 1607 by Rian Pemberton RN  Outcome: Adequate for Discharge  12/15/2023 0927 by Rian Pemberton RN  Outcome: Progressing  Goal: Absence of fever/infection during neutropenic period  Description: INTERVENTIONS:  - Monitor WBC    12/15/2023 1607 by Rian Pemberton RN  Outcome: Adequate for Discharge  12/15/2023 0927 by Rian Pemberton RN  Outcome: Progressing     Problem: SAFETY ADULT  Goal: Patient will remain free of falls  Description: INTERVENTIONS:  - Educate patient/family on patient safety including physical limitations  - Instruct patient to call for assistance with activity   - Consult OT/PT to assist with strengthening/mobility   - Keep Call bell within reach  - Keep bed low and locked with side rails adjusted as appropriate  - Keep care items and personal belongings within reach  - Initiate and maintain comfort rounds  - Make Fall Risk Sign visible to staff  - Apply yellow socks and bracelet for high fall risk patients  - Consider moving patient to room near nurses station  12/15/2023 1607 by Prashant Quintero RN  Outcome: Adequate for Discharge  12/15/2023 0927 by Prashant Quintero RN  Outcome: Progressing  Goal: Maintain or return to baseline ADL function  Description: INTERVENTIONS:  -  Assess patient's ability to carry out ADLs; assess patient's baseline for ADL function and identify physical deficits which impact ability to perform ADLs (bathing, care of mouth/teeth, toileting, grooming, dressing, etc.)  - Assess/evaluate cause of self-care deficits   - Assess range of motion  - Assess patient's mobility; develop plan if impaired  - Assess patient's need for assistive devices and provide as appropriate  - Encourage maximum independence but intervene and supervise when necessary  - Involve family in performance of ADLs  - Assess for home care needs following discharge   - Consider OT consult to assist with ADL evaluation and planning for discharge  - Provide patient education as appropriate  12/15/2023 1607 by Prashant Quintero RN  Outcome: Adequate for Discharge  12/15/2023 0927 by Prashant Quintero RN  Outcome: Progressing  Goal: Maintains/Returns to pre admission functional level  Description: INTERVENTIONS:  - Perform AM-PAC 6 Click Basic Mobility/ Daily Activity assessment daily.  - Set and communicate daily mobility goal to care team and patient/family/caregiver.    - Collaborate with rehabilitation services on mobility goals if consulted  - Out of bed for toileting  - Record patient progress and toleration of activity level   12/15/2023 1607 by Efrem Garcia RN  Outcome: Adequate for Discharge  12/15/2023 8630 by Efrem Garcia RN  Outcome: Progressing     Problem: Knowledge Deficit  Goal: Patient/family/caregiver demonstrates understanding of disease process, treatment plan, medications, and discharge instructions  Description: Complete learning assessment and assess knowledge base.   Interventions:  - Provide teaching at level of understanding  - Provide teaching via preferred learning methods  12/15/2023 1607 by Efrem Garcia RN  Outcome: Adequate for Discharge  12/15/2023 0927 by Efrem Garcia RN  Outcome: Progressing     Problem: DISCHARGE PLANNING  Goal: Discharge to home or other facility with appropriate resources  Description: INTERVENTIONS:  - Identify barriers to discharge w/patient and caregiver  - Arrange for needed discharge resources and transportation as appropriate  - Identify discharge learning needs (meds, wound care, etc.)  - Arrange for interpretive services to assist at discharge as needed  - Refer to Case Management Department for coordinating discharge planning if the patient needs post-hospital services based on physician/advanced practitioner order or complex needs related to functional status, cognitive ability, or social support system  12/15/2023 1607 by Efrem Garcia RN  Outcome: Adequate for Discharge  12/15/2023 0927 by Efrem Garcia RN  Outcome: Progressing

## 2023-12-15 NOTE — PLAN OF CARE
Problem: PAIN - ADULT  Goal: Verbalizes/displays adequate comfort level or baseline comfort level  Description: Interventions:  - Encourage patient to monitor pain and request assistance  - Assess pain using appropriate pain scale  - Administer analgesics based on type and severity of pain and evaluate response  - Implement non-pharmacological measures as appropriate and evaluate response  - Consider cultural and social influences on pain and pain management  - Notify physician/advanced practitioner if interventions unsuccessful or patient reports new pain  Outcome: Progressing     Problem: INFECTION - ADULT  Goal: Absence or prevention of progression during hospitalization  Description: INTERVENTIONS:  - Assess and monitor for signs and symptoms of infection  - Monitor lab/diagnostic results  - Monitor all insertion sites, i.e. indwelling lines, tubes, and drains  - Monitor endotracheal if appropriate and nasal secretions for changes in amount and color  - [place] appropriate cooling/warming therapies per order  - Administer medications as ordered  - Instruct and encourage patient and family to use good hand hygiene technique  - Identify and instruct in appropriate isolation precautions for identified infection/condition  Outcome: Progressing  Goal: Absence of fever/infection during neutropenic period  Description: INTERVENTIONS:  - Monitor WBC    Outcome: Progressing     Problem: SAFETY ADULT  Goal: Patient will remain free of falls  Description: INTERVENTIONS:  - Educate patient/family on patient safety including physical limitations  - Instruct patient to call for assistance with activity   - Consult OT/PT to assist with strengthening/mobility   - Keep Call bell within reach  - Keep bed low and locked with side rails adjusted as appropriate  - Keep care items and personal belongings within reach  - Initiate and maintain comfort rounds  - Make Fall Risk Sign visible to staff  - Apply yellow socks and bracelet for high fall risk patients  - Consider moving patient to room near nurses station  Outcome: Progressing  Goal: Maintain or return to baseline ADL function  Description: INTERVENTIONS:  -  Assess patient's ability to carry out ADLs; assess patient's baseline for ADL function and identify physical deficits which impact ability to perform ADLs (bathing, care of mouth/teeth, toileting, grooming, dressing, etc.)  - Assess/evaluate cause of self-care deficits   - Assess range of motion  - Assess patient's mobility; develop plan if impaired  - Assess patient's need for assistive devices and provide as appropriate  - Encourage maximum independence but intervene and supervise when necessary  - Involve family in performance of ADLs  - Assess for home care needs following discharge   - Consider OT consult to assist with ADL evaluation and planning for discharge  - Provide patient education as appropriate  Outcome: Progressing  Goal: Maintains/Returns to pre admission functional level  Description: INTERVENTIONS:  - Perform AM-PAC 6 Click Basic Mobility/ Daily Activity assessment daily.  - Set and communicate daily mobility goal to care team and patient/family/caregiver. - Collaborate with rehabilitation services on mobility goals if consulted  - Out of bed for toileting  - Record patient progress and toleration of activity level   Outcome: Progressing     Problem: Knowledge Deficit  Goal: Patient/family/caregiver demonstrates understanding of disease process, treatment plan, medications, and discharge instructions  Description: Complete learning assessment and assess knowledge base.   Interventions:  - Provide teaching at level of understanding  - Provide teaching via preferred learning methods  Outcome: Progressing     Problem: DISCHARGE PLANNING  Goal: Discharge to home or other facility with appropriate resources  Description: INTERVENTIONS:  - Identify barriers to discharge w/patient and caregiver  - Arrange for needed discharge resources and transportation as appropriate  - Identify discharge learning needs (meds, wound care, etc.)  - Arrange for interpretive services to assist at discharge as needed  - Refer to Case Management Department for coordinating discharge planning if the patient needs post-hospital services based on physician/advanced practitioner order or complex needs related to functional status, cognitive ability, or social support system  Outcome: Progressing
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
## 2023-12-15 NOTE — LACTATION NOTE
This note was copied from a baby's chart. Mom C/O sore nipples earlier this morning. Information given about sore nipples and how to correct with positioning techniques. Discussed maneuvers to latch infant on properly to avoid nipple pain and promote healing. Discussed treatments that could be utilized to promote healing. Hydro gel dressings given with instruction and verbalization of understanding of cleaning and duration of use. Encouraged to call for latch assist.    Discussed risks for early supplementation: over feeding, longer digestion times, engorgement for mom, lower milk supply for mom, and nipple confusion. Benefits of breast feeding for infant's intestinal tract, less engorgement for mom, protection from multiple disease processes as infant develops, avoidance of over feeding for infant, less nipple confusion, and increased health benefits for mom. Parents called in at this time to assist with positioning/maintaining deep latch. Worked on positioning infant up at chest level and starting to feed infant with nose arriving at the nipple. Then, using areolar compression to achieve a deep latch that is comfortable and exchanges optimum amounts of milk. Guided dyad to deep latch on right breast using football hold. Deep long latch stimulated to keep rocker suckling after a few attempts. Suckled till popped off with relaxed tone. Burp attempt. Baby then guided to deep latch quickly this time. Baby remained latched deeply with stimulation to maintain rocker suckling till asleep at breast. Mom noted comfort at breast after latch on tenderness and relaxed tone after feed. Dad also shown signs of good latch/feed. Parents seem pleased with session.        12/15/23 1493   LATCH Documentation   Latch 1  (More consistent with time)   Audible Swallowing 1   Type of Nipple 2   Comfort (Breast/Nipple) 1  (states this feeding is much more comfortable)   Hold (Positioning) 1   LATCH Score 6   Having latch problems? No  (working on consistent deep latch)   Position(s) Used Football   Breasts/Nipples   Left Breast Filling   Right Breast Filling   Left Nipple Everted;Tender   Right Nipple Everted;Tender   Intervention Other (comment)  (helped with positioning/maintaining deep latch; review of D/C information)   Breastfeeding Progress Not yet established   Reasons for not Breastfeeding Maternal preference  (Mom was worried if baby was getting enough)   Other OB Lactation Tools   Feeding Devices Bottle   Breast Pump   Pump 3  (has Double electric breast pump for Home use)   Patient Follow-Up   Lactation Consult Status 2   Follow-Up Type Inpatient;Call as needed   Other OB Lactation Documentation    Additional Problem Noted helped with positioniong/maintaining deep latch  (Booklets reviewed for D/C information)     Encouraged parents to call for assistance, questions, and concerns about breastfeeding. Extension provided.

## 2023-12-18 NOTE — UTILIZATION REVIEW
Mother and baby discharged on 12/15/2023       NOTIFICATION OF ADMISSION DISCHARGE   This is a Notification of Discharge from [facility]. Please be advised that this patient has been discharge from our facility. Below you will find the admission and discharge date and time including the patient’s disposition.   UTILIZATION REVIEW CONTACT:  Palak Mcmahan  Utilization   Network Utilization Review Department  Phone: [phone] x carefully listen to the prompts. All voicemails are confidential.  Email: [email or web address]     ADMISSION INFORMATION  PRESENTATION DATE: 12/12/2023  9:07 PM  OBERVATION ADMISSION DATE:   INPATIENT ADMISSION DATE: 12/12/23  9:39 PM   DISCHARGE DATE: 12/15/2023  4:38 PM   DISPOSITION:Home/Self Care    Network Utilization Review Department  ATTENTION: Please call with any questions or concerns to [phone] and carefully listen to the prompts so that you are directed to the right person. All voicemails are confidential.   For Discharge needs, contact Care Management DC Support Team at [phone] opt. 2  Send all requests for admission clinical reviews, approved or denied determinations and any other requests to dedicated fax number below belonging to the campus where the patient is receiving treatment. List of dedicated fax numbers for the Facilities:  FACILITY NAME UR FAX NUMBER   ADMISSION DENIALS (Administrative/Medical Necessity) [phone]   DISCHARGE SUPPORT TEAM ([facility]) [phone]   PARENT CHILD HEALTH (Maternity/NICU/Pediatrics) [phone]   [facility] [phone]   [facility] [phone]   [facility] [phone]   [facility] [phone]   [facility] [phone]   [facility] [phone]   [facility] [phone]   [facility]  [facility] [phone]   [facility] [phone]   [facility] [phone]   [facility] [phone]

## 2023-12-22 LAB — PLACENTA IN STORAGE: NORMAL

## 2024-01-26 ENCOUNTER — POSTPARTUM VISIT (OUTPATIENT)
Dept: OBGYN CLINIC | Facility: MEDICAL CENTER | Age: 29
End: 2024-01-26

## 2024-01-26 VITALS
SYSTOLIC BLOOD PRESSURE: 100 MMHG | BODY MASS INDEX: 35.65 KG/M2 | DIASTOLIC BLOOD PRESSURE: 60 MMHG | HEIGHT: 65 IN | WEIGHT: 214 LBS

## 2024-01-26 PROCEDURE — 99024 POSTOP FOLLOW-UP VISIT: CPT | Performed by: OBSTETRICS & GYNECOLOGY

## 2024-01-26 NOTE — PROGRESS NOTES
"Postpartum Visit   [facility] [address], [place], [place]    Assessment/Plan:  Caro is a 28 y.o. year old  who presents for postpartum visit.    Routine Postpartum Care  - Normal postpartum exam  - Contraception: wants Mirena  - Depression Screen: negative  - Feeding: formula  - Psychosocial support: good  - Patient Education: Postpartum resources including Pelvic Health PT and Baby & Me  - Cervical cancer screening Up to Date, next due       Additional Problems:  1. Postpartum exam          Subjective:     CC: Postpartum visit    Caro Tesfaye is a 28 y.o. female  who presents for a postpartum visit.     She is approximately 6 weeks postpartum following a  on 2023 at 39 weeks. Postpartum course has been uncomplicated. Bleeding no bleeding. Bowel function is normal. Bladder function is normal. Patient is not sexually active. Contraception method is IUD. Postpartum depression screening: negative.    Baby's course has been uncomplicated. Baby is feeding by formula.     The following portions of the patient's history were reviewed and updated as appropriate: allergies, current medications, past family history, past medical history, obstetric history, gynecologic history, past social history, past surgical history and problem list.      Objective:  /60   Ht 5' 5\" (1.651 m)   Wt 97.1 kg (214 lb)   LMP 2023 (Exact Date)   Breastfeeding No   BMI 35.61 kg/m²   Pregravid Weight/BMI: 85.3 kg (188 lb) (BMI 31.28)  Current Weight: 97.1 kg (214 lb)   Total Weight Gain: 20 kg (44 lb)   Pre- Vitals      Flowsheet Row Most Recent Value   Prenatal Assessment    Prenatal Vitals    Blood Pressure 100/60   Weight - Scale 97.1 kg (214 lb)   Urine Albumin/Glucose    Dilation/Effacement/Station    Vaginal Drainage    Edema              General: Well appearing, no distress.  Mood and affect: Appropriate.  Respiratory: Unlabored breathing. Clear " to auscultate.  Cardiovascular: Regular rate and rhythm.  Abdomen: Soft, nontender  Extremities: Warm and well perfused.  Non tender.

## 2024-03-20 ENCOUNTER — PROCEDURE VISIT (OUTPATIENT)
Dept: OBGYN CLINIC | Facility: CLINIC | Age: 29
End: 2024-03-20
Payer: COMMERCIAL

## 2024-03-20 VITALS
DIASTOLIC BLOOD PRESSURE: 76 MMHG | HEIGHT: 65 IN | SYSTOLIC BLOOD PRESSURE: 118 MMHG | WEIGHT: 212 LBS | BODY MASS INDEX: 35.32 KG/M2

## 2024-03-20 DIAGNOSIS — Z30.430 ENCOUNTER FOR IUD INSERTION: Primary | ICD-10-CM

## 2024-03-20 PROBLEM — O99.210 OBESITY IN PREGNANCY: Status: RESOLVED | Noted: 2023-06-09 | Resolved: 2024-03-20

## 2024-03-20 PROBLEM — O13.9 GESTATIONAL HYPERTENSION: Status: RESOLVED | Noted: 2023-12-14 | Resolved: 2024-03-20

## 2024-03-20 PROBLEM — O09.899 RUBELLA NON-IMMUNE STATUS, ANTEPARTUM: Status: RESOLVED | Noted: 2023-06-09 | Resolved: 2024-03-20

## 2024-03-20 PROBLEM — Z3A.39 39 WEEKS GESTATION OF PREGNANCY: Status: RESOLVED | Noted: 2023-06-09 | Resolved: 2024-03-20

## 2024-03-20 PROBLEM — O46.8X1 SUBCHORIONIC HEMATOMA IN FIRST TRIMESTER: Status: RESOLVED | Noted: 2023-06-12 | Resolved: 2024-03-20

## 2024-03-20 PROBLEM — Z28.39 RUBELLA NON-IMMUNE STATUS, ANTEPARTUM: Status: RESOLVED | Noted: 2023-06-09 | Resolved: 2024-03-20

## 2024-03-20 PROBLEM — O26.03 EXCESSIVE WEIGHT GAIN DURING PREGNANCY IN THIRD TRIMESTER: Status: RESOLVED | Noted: 2023-09-01 | Resolved: 2024-03-20

## 2024-03-20 PROBLEM — O24.410 DIET CONTROLLED GESTATIONAL DIABETES MELLITUS (GDM) IN THIRD TRIMESTER: Status: RESOLVED | Noted: 2023-09-29 | Resolved: 2024-03-20

## 2024-03-20 PROBLEM — O41.8X10 SUBCHORIONIC HEMATOMA IN FIRST TRIMESTER: Status: RESOLVED | Noted: 2023-06-12 | Resolved: 2024-03-20

## 2024-03-20 LAB — SL AMB POCT URINE HCG: NEGATIVE

## 2024-03-20 PROCEDURE — 58300 INSERT INTRAUTERINE DEVICE: CPT | Performed by: NURSE PRACTITIONER

## 2024-03-20 PROCEDURE — 81025 URINE PREGNANCY TEST: CPT | Performed by: NURSE PRACTITIONER

## 2024-03-20 NOTE — PROGRESS NOTES
Iud insertions    Date/Time: 3/20/2024 10:15 AM    Performed by: MILLA Daugherty  Authorized by: MILLA Daugherty    Other Assisting Provider: No    Verbal consent obtained?: Yes    Written consent obtained?: Yes    Risks and benefits: Risks, benefits and alternatives were discussed    Consent given by:  Patient  Time Out:     Time out: Immediately prior to the procedure a time out was called      Time out performed at:  3/20/2024 10:15 AM  Patient states understanding of procedure being performed: Yes    Patient's understanding of procedure matches consent: Yes    Procedure consent matches procedure scheduled: Yes    Relevant documents present and verified: Yes    Test results available and properly labeled: Yes    Site marked: No    Radiology Images displayed and confirmed. If images not available, report reviewed: No    Required items: Required blood products, implants, devices and special equipment available    Patient identity confirmed:  Verbally with patient  Procedure:     Pelvic exam performed: yes      Negative GC/chlamydia test: yes      Negative urine pregnancy test: yes      Negative serum pregnancy test: no      Cervix cleaned and prepped: yes      Speculum placed in vagina: yes      Allis applied to cervix: yes      Uterus sounded: yes      Uterus sound depth (cm):  8    IUD inserted with no complications: yes      IUD type:  Mirena (LOT [id number])    Strings trimmed: yes (2 cm)    Post-procedure:     Patient tolerated procedure well: yes      Patient will follow up after next period: yes    Comments:      Please schedule for Mirena IUD insertion.  Risks, benefits and alternatives reviewed.  Common side effects reviewed.  Insertion and removal procedures reviewed.  Consent reviewed and signed.  Patient desires insertion at today's visit.  IUD inserted without difficulty.  Patient tolerated well  Aware to avoid tub baths or intercourse for the next 48 hours.  Should use backup method for at  least 7 days  Signs and symptoms to report reviewed  RTO 4 weeks for string check

## 2024-04-22 PROBLEM — Z30.431 IUD CHECK UP: Status: ACTIVE | Noted: 2024-03-20

## 2024-05-30 ENCOUNTER — OFFICE VISIT (OUTPATIENT)
Dept: URGENT CARE | Age: 29
End: 2024-05-30
Payer: COMMERCIAL

## 2024-05-30 VITALS
TEMPERATURE: 98.3 F | OXYGEN SATURATION: 99 % | DIASTOLIC BLOOD PRESSURE: 78 MMHG | RESPIRATION RATE: 18 BRPM | HEART RATE: 82 BPM | SYSTOLIC BLOOD PRESSURE: 108 MMHG

## 2024-05-30 DIAGNOSIS — J02.9 SORE THROAT: Primary | ICD-10-CM

## 2024-05-30 LAB — S PYO AG THROAT QL: NEGATIVE

## 2024-05-30 PROCEDURE — 99214 OFFICE O/P EST MOD 30 MIN: CPT | Performed by: PHYSICIAN ASSISTANT

## 2024-05-30 PROCEDURE — 87070 CULTURE OTHR SPECIMN AEROBIC: CPT | Performed by: PHYSICIAN ASSISTANT

## 2024-05-30 PROCEDURE — 87880 STREP A ASSAY W/OPTIC: CPT | Performed by: PHYSICIAN ASSISTANT

## 2024-05-30 NOTE — PROGRESS NOTES
[facility]        NAME: Caro Tesfaye is a 28 y.o. female  : 1995    MRN: [id number]  DATE: May 30, 2024  TIME: 11:11 AM    Assessment and Plan   Sore throat [J02.9]  1. Sore throat  POCT rapid ANTIGEN strepA    Throat culture      Patient presents with sore throat symptoms concerning for possible strep.  Rapid strep in clinic is negative will send for culture.  Discussed with patient that until culture comes back we will treat this with viral or allergic until proven otherwise.  Recommend symptomatic and supportive care.      Patient Instructions     Patient Instructions   Rapid strep in the office is negative. Result will be sent for culture as the rapid test is not always accurate. If the result comes back positive we will call you to start antibiotic treatment. If you see the result is positive in your MyChart and do not receive a call within 1-2 hours call the office to request antibiotics.   Over the counter antihistamine and/or Flonase as needed.  Salt water gargles.   Over the counter throat lozenges such as Cepocal or Chloroseptic.  If symptoms are not improved in 3-5 days, follow-up with PCP.   If symptoms worsen or new symptoms such as fever, drooling, voice changes, anterior neck pain, or difficulty swallowing develop report to the emergency room immediately.     Follow up with PCP in 3-5 days.  Proceed to  ER if symptoms worsen.    If tests have been performed at [facility], our office will contact you with results if changes need to be made to the care plan discussed with you at the visit. You can review your full results on [facility].     Chief Complaint     Chief Complaint   Patient presents with    Sore Throat    Headache     Patient started with a sore throat and a headache last night.          History of Present Illness       28 year old female presents with complaint of headache and sore throat that began yesterday evening. Pt denies fever, chills, runny nose, sinus  congestion, and cough. Brother recently tested positive for strep. Pt denies sharing utensils and toothpaste.     Sore Throat   Associated symptoms include headaches. Pertinent negatives include no congestion, coughing or neck pain.   Headache      Review of Systems   Review of Systems   Constitutional:  Negative for chills and fever.   HENT:  Positive for sore throat. Negative for congestion, postnasal drip and rhinorrhea.    Respiratory:  Negative for cough.    Musculoskeletal:  Negative for neck pain.   Neurological:  Positive for headaches.         Current Medications     No current outpatient medications on file.    Current Facility-Administered Medications:     levonorgestrel (MIRENA) IUD 20 mcg/day, 1 each, Intrauterine Device, Once every 8 years, , 1 Intra Uterine Device at 03/20/24 1036    Current Allergies     Allergies as of 05/30/2024    (No Known Allergies)            The following portions of the patient's history were reviewed and updated as appropriate: allergies, current medications, past family history, past medical history, past social history, past surgical history and problem list.     Past Medical History:   Diagnosis Date    Coronavirus infection     tested positive 4/20/20, never had fever, cleared via telemedicine    COVID-19 virus infection 4/21/2020    History of PCOS     getting periods regularly now    Obesity, Class II, BMI 35-39.9     Polycystic ovary syndrome     Varicella vaccine        Past Surgical History:   Procedure Laterality Date    EAR TUBE REMOVAL      WISDOM TOOTH EXTRACTION         Family History   Problem Relation Age of Onset    No Known Problems Mother         no preeclampsia    Hypertension Father     No Known Problems Brother     No Known Problems Brother     No Known Problems Son     No Known Problems Maternal Grandmother     No Known Problems Maternal Grandfather     Diabetes Paternal Grandmother     No Known Problems Paternal Grandfather     Breast cancer Neg Hx      Colon cancer Neg Hx     Ovarian cancer Neg Hx     Uterine cancer Neg Hx          Medications have been verified.        Objective   /78   Pulse 82   Temp 98.3 °F (36.8 °C)   Resp 18   SpO2 99%   No LMP recorded.       Physical Exam     Physical Exam  Vitals and nursing note reviewed.   Constitutional:       General: She is not in acute distress.     Appearance: Normal appearance. She is not ill-appearing or toxic-appearing.   HENT:      Head: Normocephalic and atraumatic.      Jaw: No trismus.      Right Ear: Hearing, tympanic membrane, ear canal and external ear normal. There is no impacted cerumen. No foreign body.      Left Ear: Hearing, tympanic membrane, ear canal and external ear normal. There is no impacted cerumen. No foreign body.      Nose: No nasal deformity, mucosal edema, congestion or rhinorrhea.      Right Nostril: No foreign body, epistaxis or occlusion.      Left Nostril: No foreign body, epistaxis or occlusion.      Right Turbinates: Not enlarged, swollen or pale.      Left Turbinates: Not enlarged, swollen or pale.      Mouth/Throat:      Lips: Pink. No lesions.      Mouth: Mucous membranes are moist. No injury, oral lesions or angioedema.      Dentition: Normal dentition.      Tongue: No lesions. Tongue does not deviate from midline.      Palate: No mass and lesions.      Pharynx: Uvula midline. Pharyngeal swelling and posterior oropharyngeal erythema present. No oropharyngeal exudate or uvula swelling.      Tonsils: No tonsillar exudate or tonsillar abscesses.      Comments: Mild erythema and swelling of the posterior oropharynx with postnasal drip.  No tonsillar swelling or exudates.   Eyes:      General: Lids are normal. Gaze aligned appropriately. No allergic shiner.     Extraocular Movements: Extraocular movements intact.   Cardiovascular:      Rate and Rhythm: Normal rate.   Pulmonary:      Effort: Pulmonary effort is normal.      Comments: Patient speaking in full sentences  "with no increased respiratory effort.   No audible wheezing or stridor.   Lymphadenopathy:      Cervical: No cervical adenopathy.   Skin:     General: Skin is warm and dry.   Neurological:      Mental Status: She is alert and oriented to person, place, and time.      Coordination: Coordination is intact.      Gait: Gait is intact.   Psychiatric:         Attention and Perception: Attention and perception normal.         Mood and Affect: Mood and affect normal.         Speech: Speech normal.         Behavior: Behavior is cooperative.               Note: Portions of this record may have been created with voice recognition software. Occasional wrong word or \"sound a like\" substitutions may have occurred due to the inherent limitations of voice recognition software. Please read the chart carefully and recognize, using context, where substitutions have occurred.*      "

## 2024-05-30 NOTE — PATIENT INSTRUCTIONS
Rapid strep in the office is negative. Result will be sent for culture as the rapid test is not always accurate. If the result comes back positive we will call you to start antibiotic treatment. If you see the result is positive in your MyChart and do not receive a call within 1-2 hours call the office to request antibiotics.   Over the counter antihistamine and/or Flonase as needed.  Salt water gargles.   Over the counter throat lozenges such as Cepocal or Chloroseptic.  If symptoms are not improved in 3-5 days, follow-up with PCP.   If symptoms worsen or new symptoms such as fever, drooling, voice changes, anterior neck pain, or difficulty swallowing develop report to the emergency room immediately.

## 2024-06-01 LAB — BACTERIA THROAT CULT: NORMAL
